# Patient Record
Sex: FEMALE | Race: BLACK OR AFRICAN AMERICAN
[De-identification: names, ages, dates, MRNs, and addresses within clinical notes are randomized per-mention and may not be internally consistent; named-entity substitution may affect disease eponyms.]

---

## 2018-05-24 ENCOUNTER — HOSPITAL ENCOUNTER (OUTPATIENT)
Dept: HOSPITAL 61 - ENDOS | Age: 58
Discharge: HOME | End: 2018-05-24
Attending: INTERNAL MEDICINE
Payer: MEDICARE

## 2018-05-24 DIAGNOSIS — Z86.73: ICD-10-CM

## 2018-05-24 DIAGNOSIS — E03.9: ICD-10-CM

## 2018-05-24 DIAGNOSIS — K64.0: ICD-10-CM

## 2018-05-24 DIAGNOSIS — E78.00: ICD-10-CM

## 2018-05-24 DIAGNOSIS — Z98.890: ICD-10-CM

## 2018-05-24 DIAGNOSIS — M17.12: ICD-10-CM

## 2018-05-24 DIAGNOSIS — Z91.040: ICD-10-CM

## 2018-05-24 DIAGNOSIS — Z12.11: Primary | ICD-10-CM

## 2018-05-24 DIAGNOSIS — I50.9: ICD-10-CM

## 2018-05-24 DIAGNOSIS — G47.30: ICD-10-CM

## 2018-05-24 DIAGNOSIS — D12.8: ICD-10-CM

## 2018-05-24 DIAGNOSIS — Z88.8: ICD-10-CM

## 2018-05-24 DIAGNOSIS — E66.9: ICD-10-CM

## 2018-05-24 DIAGNOSIS — I11.0: ICD-10-CM

## 2018-05-24 DIAGNOSIS — Z87.891: ICD-10-CM

## 2018-05-24 PROCEDURE — 88305 TISSUE EXAM BY PATHOLOGIST: CPT

## 2018-05-24 PROCEDURE — 45385 COLONOSCOPY W/LESION REMOVAL: CPT

## 2018-05-24 PROCEDURE — 45380 COLONOSCOPY AND BIOPSY: CPT

## 2018-05-24 RX ADMIN — BACITRACIN 1 MLS/HR: 5000 INJECTION, POWDER, FOR SOLUTION INTRAMUSCULAR at 14:06

## 2020-01-25 ENCOUNTER — HOSPITAL ENCOUNTER (INPATIENT)
Dept: HOSPITAL 61 - ER | Age: 60
LOS: 3 days | Discharge: HOME | DRG: 640 | End: 2020-01-28
Attending: INTERNAL MEDICINE | Admitting: INTERNAL MEDICINE
Payer: MEDICARE

## 2020-01-25 VITALS — SYSTOLIC BLOOD PRESSURE: 209 MMHG | DIASTOLIC BLOOD PRESSURE: 95 MMHG

## 2020-01-25 VITALS — SYSTOLIC BLOOD PRESSURE: 184 MMHG | DIASTOLIC BLOOD PRESSURE: 79 MMHG

## 2020-01-25 VITALS — SYSTOLIC BLOOD PRESSURE: 209 MMHG | DIASTOLIC BLOOD PRESSURE: 82 MMHG

## 2020-01-25 VITALS — BODY MASS INDEX: 48.75 KG/M2 | HEIGHT: 63 IN | WEIGHT: 275.12 LBS

## 2020-01-25 DIAGNOSIS — Z91.040: ICD-10-CM

## 2020-01-25 DIAGNOSIS — E21.3: ICD-10-CM

## 2020-01-25 DIAGNOSIS — Z88.8: ICD-10-CM

## 2020-01-25 DIAGNOSIS — E78.5: ICD-10-CM

## 2020-01-25 DIAGNOSIS — Z91.19: ICD-10-CM

## 2020-01-25 DIAGNOSIS — I12.0: ICD-10-CM

## 2020-01-25 DIAGNOSIS — Z99.2: ICD-10-CM

## 2020-01-25 DIAGNOSIS — E66.01: ICD-10-CM

## 2020-01-25 DIAGNOSIS — N18.6: ICD-10-CM

## 2020-01-25 DIAGNOSIS — Z91.15: ICD-10-CM

## 2020-01-25 DIAGNOSIS — E87.5: Primary | ICD-10-CM

## 2020-01-25 DIAGNOSIS — Z82.49: ICD-10-CM

## 2020-01-25 DIAGNOSIS — Z86.73: ICD-10-CM

## 2020-01-25 DIAGNOSIS — D63.1: ICD-10-CM

## 2020-01-25 LAB
ALBUMIN SERPL-MCNC: 3.1 G/DL (ref 3.4–5)
ALBUMIN/GLOB SERPL: 0.7 {RATIO} (ref 1–1.7)
ALP SERPL-CCNC: 73 U/L (ref 46–116)
ALT SERPL-CCNC: 15 U/L (ref 14–59)
ANION GAP SERPL CALC-SCNC: 14 MMOL/L (ref 6–14)
AST SERPL-CCNC: 13 U/L (ref 15–37)
BASOPHILS # BLD AUTO: 0 X10^3/UL (ref 0–0.2)
BASOPHILS NFR BLD: 1 % (ref 0–3)
BILIRUB SERPL-MCNC: 0.4 MG/DL (ref 0.2–1)
BUN SERPL-MCNC: 103 MG/DL (ref 7–20)
BUN/CREAT SERPL: 7 (ref 6–20)
CALCIUM SERPL-MCNC: 9.2 MG/DL (ref 8.5–10.1)
CHLORIDE SERPL-SCNC: 108 MMOL/L (ref 98–107)
CO2 SERPL-SCNC: 21 MMOL/L (ref 21–32)
CREAT SERPL-MCNC: 14.6 MG/DL (ref 0.6–1)
EOSINOPHIL NFR BLD: 0 X10^3/UL (ref 0–0.7)
EOSINOPHIL NFR BLD: 1 % (ref 0–3)
ERYTHROCYTE [DISTWIDTH] IN BLOOD BY AUTOMATED COUNT: 17.8 % (ref 11.5–14.5)
GFR SERPLBLD BASED ON 1.73 SQ M-ARVRAT: 3.1 ML/MIN
GLOBULIN SER-MCNC: 4.3 G/DL (ref 2.2–3.8)
GLUCOSE SERPL-MCNC: 92 MG/DL (ref 70–99)
HCT VFR BLD CALC: 27.5 % (ref 36–47)
HGB BLD-MCNC: 9.1 G/DL (ref 12–15.5)
LYMPHOCYTES # BLD: 0.5 X10^3/UL (ref 1–4.8)
LYMPHOCYTES NFR BLD AUTO: 11 % (ref 24–48)
MAGNESIUM SERPL-MCNC: 2 MG/DL (ref 1.8–2.4)
MCH RBC QN AUTO: 33 PG (ref 25–35)
MCHC RBC AUTO-ENTMCNC: 33 G/DL (ref 31–37)
MCV RBC AUTO: 99 FL (ref 79–100)
MONO #: 0.2 X10^3/UL (ref 0–1.1)
MONOCYTES NFR BLD: 4 % (ref 0–9)
NEUT #: 3.6 X10^3/UL (ref 1.8–7.7)
NEUTROPHILS NFR BLD AUTO: 83 % (ref 31–73)
PLATELET # BLD AUTO: 161 X10^3/UL (ref 140–400)
POTASSIUM SERPL-SCNC: 5.8 MMOL/L (ref 3.5–5.1)
PROT SERPL-MCNC: 7.4 G/DL (ref 6.4–8.2)
RBC # BLD AUTO: 2.78 X10^6/UL (ref 3.5–5.4)
SODIUM SERPL-SCNC: 143 MMOL/L (ref 136–145)
WBC # BLD AUTO: 4.3 X10^3/UL (ref 4–11)

## 2020-01-25 PROCEDURE — 80048 BASIC METABOLIC PNL TOTAL CA: CPT

## 2020-01-25 PROCEDURE — 84484 ASSAY OF TROPONIN QUANT: CPT

## 2020-01-25 PROCEDURE — 83735 ASSAY OF MAGNESIUM: CPT

## 2020-01-25 PROCEDURE — 80069 RENAL FUNCTION PANEL: CPT

## 2020-01-25 PROCEDURE — 93005 ELECTROCARDIOGRAM TRACING: CPT

## 2020-01-25 PROCEDURE — 81001 URINALYSIS AUTO W/SCOPE: CPT

## 2020-01-25 PROCEDURE — 87340 HEPATITIS B SURFACE AG IA: CPT

## 2020-01-25 PROCEDURE — G0378 HOSPITAL OBSERVATION PER HR: HCPCS

## 2020-01-25 PROCEDURE — 36415 COLL VENOUS BLD VENIPUNCTURE: CPT

## 2020-01-25 PROCEDURE — 85025 COMPLETE CBC W/AUTO DIFF WBC: CPT

## 2020-01-25 PROCEDURE — 96374 THER/PROPH/DIAG INJ IV PUSH: CPT

## 2020-01-25 PROCEDURE — 80053 COMPREHEN METABOLIC PANEL: CPT

## 2020-01-25 RX ADMIN — HYDRALAZINE HYDROCHLORIDE PRN MG: 20 INJECTION INTRAMUSCULAR; INTRAVENOUS at 22:49

## 2020-01-25 NOTE — PHYS DOC
Past Medical History


Past Medical History:  Hypertension, Renal Disease, Renal Failure, Stroke


Additional Past Medical Histor:  stroke 2013, 2019


Past Surgical History:  No Surgical History


Alcohol Use:  Occasionally


Drug Use:  None





Adult General


Chief Complaint


Chief Complaint:  DIALYSIS PROBLEM





HPI


HPI





Patient is a 59  year old [female] who presents with [fatigue, malaise. Patient 

reports she has not been to dialysis approximately 1.5 weeks, missing 5 

treatments at this time. States that she just did not have the energy to go. 

States she know she needs to her dialysis, however she just could not. Reports 

she has felt some fatigue, aching since that time. Does report she is still 

producing urine, also reports she no she has high blood pressure, does take 

hydralazine he stay for blood pressure. However has not taken any doses this 

evening. Denies dizziness. Denies nausea, vomiting, diarrhea. Denies abdominal 

pain. States she feels fine, not having any discomfort, but does continue to 

feel malaise


]





Review of Systems


Review of Systems





Constitutional: Denies fever or chills reports generalized malaise []


Eyes: Denies change in visual acuity, redness, or eye pain []


HENT: Denies nasal congestion or sore throat []


Respiratory: Denies cough or shortness of breath []


Cardiovascular: No additional information not addressed in HPI []


GI: Denies abdominal pain, nausea, vomiting, bloody stools or diarrhea []


: Denies dysuria or hematuria reports she still produces urine, although a 

small amount[]


Musculoskeletal: Denies back pain or joint pain []


Integument: Denies rash or skin lesions []


Neurologic: Denies headache, focal weakness or sensory changes []


Endocrine: Denies polyuria or polydipsia []





All other systems were reviewed and found to be within normal limits, except as 

documented in this note.





Current Medications


Current Medications





Current Medications








 Medications


  (Trade)  Dose


 Ordered  Sig/Varun  Start Time


 Stop Time Status Last Admin


Dose Admin


 


 Amlodipine


 Besylate


  (Norvasc)  10 mg  DAILY  1/25/20 19:15


   UNV  





 


 Hydralazine HCl


  (Apresoline Inj)  10 mg  1X  ONCE  1/25/20 18:00


 1/25/20 18:06 DC 1/25/20 18:17


10 MG


 


 Hydralazine HCl


  (Apresoline)  50 mg  TID  1/25/20 21:00


   UNV  





 


 Non-Formulary


 Medication


  (Carvedilol


  (Coreg))  1 tab  BID  1/25/20 21:00


   UNV  





 


 Non-Formulary


 Medication


  (Doxazosin


 Mesylate )  1 tab  DAILY  1/26/20 09:00


   UNV  





 


 Sodium


 Polystyrene


 Sulfonate


  (Kayexalate)  15 gm  1X  ONCE  1/25/20 19:00


 1/25/20 19:01 DC 1/25/20 19:05


15 GM











Allergies


Allergies





Allergies








Coded Allergies Type Severity Reaction Last Updated Verified


 


  latex Allergy Severe Rash 5/24/18 Yes


 


  iodine Adverse Reaction Intermediate  5/24/18 Yes











Physical Exam


Physical Exam





Constitutional: Well developed, well nourished, no acute distress, non-toxic 

appearance. Grossly obese  []


HENT: Normocephalic, atraumatic, bilateral external ears normal, oropharynx josé

st, no oral exudates, nose normal. []


Eyes: PERRLA, EOMI, conjunctiva normal, no discharge. [] 


Neck: Normal range of motion, no tenderness, supple, no stridor. [] 


Cardiovascular:Heart rate regular rhythm, no murmur []


Lungs & Thorax:  Bilateral breath sounds clear to auscultation []


Abdomen: Bowel sounds normal, soft, no tenderness, no masses, no pulsatile 

masses. [] 


Skin: Warm, dry, no erythema, no rash. [] 


Back: No tenderness, no CVA tenderness. [] 


Extremities: No tenderness, no cyanosis, no clubbing, ROM intact, no edema. [] 


Neurologic: Alert and oriented X 3, normal motor function, normal sensory 

function, no focal deficits noted. []


Psychologic: Affect normal, judgement normal, mood normal. []





Current Patient Data


Vital Signs





                                   Vital Signs








  Date Time  Temp Pulse Resp B/P (MAP) Pulse Ox O2 Delivery O2 Flow Rate FiO2


 


1/25/20 18:54  73 18 185/79 (114) 95 Room Air  


 


1/25/20 17:39 97.6       





 97.6       








Lab Values





                                Laboratory Tests








Test


 1/25/20


17:50


 


White Blood Count


 4.3 x10^3/uL


(4.0-11.0)


 


Red Blood Count


 2.78 x10^6/uL


(3.50-5.40)  L


 


Hemoglobin


 9.1 g/dL


(12.0-15.5)  L


 


Hematocrit


 27.5 %


(36.0-47.0)  L


 


Mean Corpuscular Volume


 99 fL ()





 


Mean Corpuscular Hemoglobin 33 pg (25-35)  


 


Mean Corpuscular Hemoglobin


Concent 33 g/dL


(31-37)


 


Red Cell Distribution Width


 17.8 %


(11.5-14.5)  H


 


Platelet Count


 161 x10^3/uL


(140-400)


 


Neutrophils (%) (Auto) 83 % (31-73)  H


 


Lymphocytes (%) (Auto) 11 % (24-48)  L


 


Monocytes (%) (Auto) 4 % (0-9)  


 


Eosinophils (%) (Auto) 1 % (0-3)  


 


Basophils (%) (Auto) 1 % (0-3)  


 


Neutrophils # (Auto)


 3.6 x10^3/uL


(1.8-7.7)


 


Lymphocytes # (Auto)


 0.5 x10^3/uL


(1.0-4.8)  L


 


Monocytes # (Auto)


 0.2 x10^3/uL


(0.0-1.1)


 


Eosinophils # (Auto)


 0.0 x10^3/uL


(0.0-0.7)


 


Basophils # (Auto)


 0.0 x10^3/uL


(0.0-0.2)


 


Sodium Level


 143 mmol/L


(136-145)


 


Potassium Level


 5.8 mmol/L


(3.5-5.1)  H


 


Chloride Level


 108 mmol/L


()  H


 


Carbon Dioxide Level


 21 mmol/L


(21-32)


 


Anion Gap 14 (6-14)  


 


Blood Urea Nitrogen


 103 mg/dL


(7-20)  H


 


Creatinine


 14.6 mg/dL


(0.6-1.0)  H


 


Estimated GFR


(Cockcroft-Gault) 3.1  





 


BUN/Creatinine Ratio 7 (6-20)  


 


Glucose Level


 92 mg/dL


(70-99)


 


Calcium Level


 9.2 mg/dL


(8.5-10.1)


 


Magnesium Level


 2.0 mg/dL


(1.8-2.4)


 


Total Bilirubin


 0.4 mg/dL


(0.2-1.0)


 


Aspartate Amino Transferase


(AST) 13 U/L (15-37)


L


 


Alanine Aminotransferase (ALT)


 15 U/L (14-59)





 


Alkaline Phosphatase


 73 U/L


()


 


Troponin I Quantitative


 < 0.017 ng/mL


(0.000-0.055)


 


Total Protein


 7.4 g/dL


(6.4-8.2)


 


Albumin


 3.1 g/dL


(3.4-5.0)  L


 


Albumin/Globulin Ratio


 0.7 (1.0-1.7)


L





                                Laboratory Tests


1/25/20 17:50








                                Laboratory Tests


1/25/20 17:50











EKG


EKG


!1828 Per Dr Thayer. No ST changes. Sinus rhythm. occasional PVC[]





Radiology/Procedures


Radiology/Procedures


[]





Course & Med Decision Making


Course & Med Decision Making


Pertinent Labs and Imaging studies reviewed. (See chart for details)





[]@1845 Discussed with Dr Lua, Nephrology, recommends kayexalate and patient to

 be dialyzed tomorrow. Recommends admission. 





@1840 - Repeat blood pressure following hydralazine 185/79  Patient continues 

resting in room at this time. 





@1850 Discussed with Dr Zamora.agrees to admission.  Will continue Hydralazine 

for blood pressure.





Dragon Disclaimer


Dragon Disclaimer


This electronic medical record was generated, in whole or in part, using a voice

 recognition dictation system.





Departure


Departure


Impression:  


   Primary Impression:  


   Hyperkalemia


   Additional Impression:  


   ESRD (end stage renal disease)


Disposition:  09 ADMITTED AS INPATIENT


Admitting Physician:  HIMS


Condition:  STABLE


Referrals:  


SAVANNAH REZA (PCP)





Problem Qualifiers











LORRI GALLEGOS              Jan 25, 2020 18:04

## 2020-01-26 VITALS — DIASTOLIC BLOOD PRESSURE: 79 MMHG | SYSTOLIC BLOOD PRESSURE: 178 MMHG

## 2020-01-26 VITALS — DIASTOLIC BLOOD PRESSURE: 87 MMHG | SYSTOLIC BLOOD PRESSURE: 196 MMHG

## 2020-01-26 VITALS — DIASTOLIC BLOOD PRESSURE: 72 MMHG | SYSTOLIC BLOOD PRESSURE: 155 MMHG

## 2020-01-26 VITALS — SYSTOLIC BLOOD PRESSURE: 174 MMHG | DIASTOLIC BLOOD PRESSURE: 95 MMHG

## 2020-01-26 VITALS — SYSTOLIC BLOOD PRESSURE: 173 MMHG | DIASTOLIC BLOOD PRESSURE: 79 MMHG

## 2020-01-26 LAB
AMORPH SED URNS QL MICRO: PRESENT /HPF
ANION GAP SERPL CALC-SCNC: 15 MMOL/L (ref 6–14)
APTT PPP: YELLOW S
BACTERIA #/AREA URNS HPF: 0 /HPF
BASOPHILS # BLD AUTO: 0 X10^3/UL (ref 0–0.2)
BASOPHILS NFR BLD: 1 % (ref 0–3)
BILIRUB UR QL STRIP: NEGATIVE
BUN SERPL-MCNC: 102 MG/DL (ref 7–20)
CALCIUM SERPL-MCNC: 9.3 MG/DL (ref 8.5–10.1)
CHLORIDE SERPL-SCNC: 107 MMOL/L (ref 98–107)
CO2 SERPL-SCNC: 19 MMOL/L (ref 21–32)
CREAT SERPL-MCNC: 14.6 MG/DL (ref 0.6–1)
EOSINOPHIL NFR BLD: 0.1 X10^3/UL (ref 0–0.7)
EOSINOPHIL NFR BLD: 1 % (ref 0–3)
ERYTHROCYTE [DISTWIDTH] IN BLOOD BY AUTOMATED COUNT: 17.6 % (ref 11.5–14.5)
FIBRINOGEN PPP-MCNC: CLEAR MG/DL
GFR SERPLBLD BASED ON 1.73 SQ M-ARVRAT: 3.1 ML/MIN
GLUCOSE SERPL-MCNC: 86 MG/DL (ref 70–99)
HCT VFR BLD CALC: 26.3 % (ref 36–47)
HGB BLD-MCNC: 8.7 G/DL (ref 12–15.5)
LYMPHOCYTES # BLD: 0.6 X10^3/UL (ref 1–4.8)
LYMPHOCYTES NFR BLD AUTO: 15 % (ref 24–48)
MCH RBC QN AUTO: 33 PG (ref 25–35)
MCHC RBC AUTO-ENTMCNC: 33 G/DL (ref 31–37)
MCV RBC AUTO: 100 FL (ref 79–100)
MONO #: 0.2 X10^3/UL (ref 0–1.1)
MONOCYTES NFR BLD: 4 % (ref 0–9)
NEUT #: 3.5 X10^3/UL (ref 1.8–7.7)
NEUTROPHILS NFR BLD AUTO: 79 % (ref 31–73)
NITRITE UR QL STRIP: NEGATIVE
PH UR STRIP: 7 [PH]
PLATELET # BLD AUTO: 156 X10^3/UL (ref 140–400)
POTASSIUM SERPL-SCNC: 5.3 MMOL/L (ref 3.5–5.1)
PROT UR STRIP-MCNC: 100 MG/DL
RBC # BLD AUTO: 2.64 X10^6/UL (ref 3.5–5.4)
RBC #/AREA URNS HPF: 0 /HPF (ref 0–2)
SODIUM SERPL-SCNC: 141 MMOL/L (ref 136–145)
SQUAMOUS #/AREA URNS LPF: (no result) /LPF
UROBILINOGEN UR-MCNC: 0.2 MG/DL
WBC # BLD AUTO: 4.4 X10^3/UL (ref 4–11)
WBC #/AREA URNS HPF: 0 /HPF (ref 0–4)

## 2020-01-26 PROCEDURE — 5A1D70Z PERFORMANCE OF URINARY FILTRATION, INTERMITTENT, LESS THAN 6 HOURS PER DAY: ICD-10-PCS | Performed by: INTERNAL MEDICINE

## 2020-01-26 RX ADMIN — DOXAZOSIN SCH MG: 1 TABLET ORAL at 20:38

## 2020-01-26 RX ADMIN — HEPARIN SODIUM SCH UNIT: 5000 INJECTION, SOLUTION INTRAVENOUS; SUBCUTANEOUS at 14:13

## 2020-01-26 RX ADMIN — HYDRALAZINE HYDROCHLORIDE PRN MG: 20 INJECTION INTRAMUSCULAR; INTRAVENOUS at 08:07

## 2020-01-26 RX ADMIN — HEPARIN SODIUM SCH UNIT: 5000 INJECTION, SOLUTION INTRAVENOUS; SUBCUTANEOUS at 20:43

## 2020-01-26 RX ADMIN — CARVEDILOL SCH MG: 12.5 TABLET, FILM COATED ORAL at 14:03

## 2020-01-26 RX ADMIN — CARVEDILOL SCH MG: 12.5 TABLET, FILM COATED ORAL at 17:43

## 2020-01-26 NOTE — EKG
[facility]

              [address] [place] [address]

Test Date:    2020               Test Time:    18:21:53

Pat Name:     NAOMIE HENSLEY            Department:   

Patient ID:   [id number]           Room:          

Gender:       F                        Technician:   

:          [id number]               Requested By: LORRI GALLEGOS

Order Number: [id number]           Reading MD:     

                                 Measurements

Intervals                              Axis          

Rate:         69                       P:            47

WA:           174                      QRS:          -36

QRSD:         120                      T:            0

QT:           472                                    

QTc:          513                                    

                           Interpretive Statements

SINUS RHYTHM

VENTRICULAR PREMATURE COMPLEX(ES)

ABNORMAL LEFT AXIS DEVIATION

R-S TRANSITION ZONE IN V LEADS DISPLACED TO THE LEFT

LEFT ANTERIOR FASCICULAR BLOCK

NON SPECIFIC T ABNORMALITY

ABNORMAL ECG

No previous ECG available for comparison

## 2020-01-26 NOTE — PDOC2
CONSULT


Date of Consult


Date of Consult


DATE: 1/26/20 


TIME: 11:15





Reason for Consult


Reason for Consult:


ESRD. missed HD, Hyperkalemia





Identification/Chief Complaint


Chief Complaint


None currently





Source


Source:  Chart review, Patient





History of Present Illness


Reason for Visit:








Patient is a 59  year old [employer] ESRD on HD MWF  who presented to the ER  with 

[fatigue, malaise. She reports she has not been to dialysis approximately 1.5 

weeks, missing 5 treatments at this time due to the weather  


In the ER she reported she missed as she just did not have the energy to go . 

Does report she is still producing urine,Denies dizziness. Denies nausea, 

vomiting, diarrhea. Denies abdominal pain. States she feels fine, not having any

 discomfort, but does continue to feel malaise


Non compliance with BP meds  


]





Past Medical History


Cardiovascular:  HTN, Hyperlipidemia


Pulmonary:  Other


GI:  No pertinent hx


Heme/Onc:  Anemia NOS


Hepatobiliary:  No pertinent hx


Psych:  No pertinent hx


Infectious disease:  No pertinent hx


Endocrine:  Hyperparathyroidism





Past Surgical History


Past Surgical History:  No pertinent history





Social History


ALCOHOL:  none


Drugs:  None





Current Problem List


Problem List


Problems


Medical Problems:


(1) ESRD (end stage renal disease)


Status: Acute  





(2) Hyperkalemia


Status: Acute  











Current Medications


Current Medications





Current Medications


Hydralazine HCl (Apresoline Inj) 10 mg 1X  ONCE IVP  Last administered on 

1/25/20at 18:17;  Start 1/25/20 at 18:00;  Stop 1/25/20 at 18:06;  Status DC


Sodium Polystyrene Sulfonate (Kayexalate) 15 gm 1X  ONCE PO  Last administered 

on 1/25/20at 19:05;  Start 1/25/20 at 19:00;  Stop 1/25/20 at 19:01;  Status DC


Amlodipine Besylate (Norvasc) 10 mg DAILY PO  Last administered on 1/25/20at 

19:26;  Start 1/25/20 at 19:15


Hydralazine HCl (Apresoline) 50 mg TID PO ;  Start 1/25/20 at 19:10;  Stop 

1/25/20 at 19:12;  Status DC


Carvedilol (Coreg) 25 mg BIDWMEALS PO ;  Start 1/25/20 at 19:11;  Stop 1/25/20 

at 19:12;  Status DC


Doxazosin Mesylate (Cardura) 2 mg DAILY PO ;  Start 1/26/20 at 09:00


Hydralazine HCl (Apresoline) 50 mg TID PO  Last administered on 1/25/20at 21:07;

 Start 1/25/20 at 21:00


Carvedilol (Coreg) 25 mg BIDWMEALS PO ;  Start 1/26/20 at 08:00


Hydralazine HCl (Apresoline Inj) 10 mg PRN Q4HRS  ONCE IVP ;  Start 1/25/20 at 

19:15;  Stop 1/25/20 at 19:23;  Status DC


Hydralazine HCl (Apresoline Inj) 10 mg PRN Q4HRS  PRN IVP HYPERTENSION Last adm

inistered on 1/26/20at 08:07;  Start 1/25/20 at 19:30


Sodium Chloride 1,000 ml @  1,000 mls/hr Q1H PRN IV hypotension;  Start 1/26/20 

at 09:09;  Stop 1/26/20 at 15:08


Albumin Human 200 ml @  200 mls/hr 1X PRN  PRN IV Hypotension;  Start 1/26/20 at

09:15;  Stop 1/26/20 at 15:14


Info (PHARMACY MONITORING -- do not chart) 1 each PRN DAILY  PRN MC SEE 

COMMENTS;  Start 1/26/20 at 09:15


Info (PHARMACY MONITORING -- do not chart) 1 each PRN DAILY  PRN MC SEE 

COMMENTS;  Start 1/26/20 at 09:15;  Status UNV





Active Scripts


Active


Reported


Renvela (Sevelamer Carbonate) 800 Mg Tablet 800 Mg PO TIDWMEALS


Micardis (Telmisartan) 80 Mg Tablet 1 Tab PO DAILY


Atorvastatin Calcium 40 Mg Tablet 40 Mg PO HS


Coreg (Carvedilol) 25 Mg Tablet 1 Tab PO BID


Hydralazine Hcl 50 Mg Tablet 1 Tab PO TID


Amlodipine Besylate 10 Mg Tablet 1 Tab PO DAILY


Doxazosin Mesylate 2 Mg Tablet 1 Tab PO QHS


Sensipar (Cinacalcet Hcl) 30 Mg Tablet 1 Tab PO QMWF





Allergies


Allergies:  


Coded Allergies:  


     latex (Verified  Allergy, Severe, Rash, 5/24/18)


     iodine (Verified  Adverse Reaction, Intermediate, 5/24/18)





ROS


Review of System


   As per HPI





Physical Exam


Physical Exam


GEN:   NAD 


HEEN:    OM moist  


NECK:supple  


CVS:  RRR 


RESP: CTA, Non labored 


GI:      NT, Obese  


:     No ] CVA tenderness, No andres


NEURO- Grossly normal


DERM- NO rash


Vital Signs





Vital Signs








  Date Time  Temp Pulse Resp B/P (MAP) Pulse Ox O2 Delivery O2 Flow Rate FiO2


 


1/26/20 08:07  75  196/87    


 


1/26/20 08:00      Room Air  


 


1/26/20 07:34 97.8    94   





 97.8       


 


1/26/20 03:30   18     








Assessment & Plan


ESRD - On HD MWF, miised 5 treatments  


Presented with very high BUN/Cr and K


Dialyzed Today, seen on HD, tolerating well, continue as ordered , [facility] 


HD again tomoorow per her regular schedule 





Hyperkalemia  -Dialysis 





HTN- Missed RRT and Non compliance with antihypertensives  


Start Home antihypertensives  





Anemia- STEVEN per protocol for goal Hgb 10-11





Labs


Labs





Laboratory Tests








Test


 1/25/20


17:50 1/26/20


03:30 1/26/20


04:30


 


White Blood Count


 4.3 x10^3/uL


(4.0-11.0) 


 4.4 x10^3/uL


(4.0-11.0)


 


Red Blood Count


 2.78 x10^6/uL


(3.50-5.40) 


 2.64 x10^6/uL


(3.50-5.40)


 


Hemoglobin


 9.1 g/dL


(12.0-15.5) 


 8.7 g/dL


(12.0-15.5)


 


Hematocrit


 27.5 %


(36.0-47.0) 


 26.3 %


(36.0-47.0)


 


Mean Corpuscular Volume


 99 fL () 


 


 100 fL


()


 


Mean Corpuscular Hemoglobin 33 pg (25-35)   33 pg (25-35) 


 


Mean Corpuscular Hemoglobin


Concent 33 g/dL


(31-37) 


 33 g/dL


(31-37)


 


Red Cell Distribution Width


 17.8 %


(11.5-14.5) 


 17.6 %


(11.5-14.5)


 


Platelet Count


 161 x10^3/uL


(140-400) 


 156 x10^3/uL


(140-400)


 


Neutrophils (%) (Auto) 83 % (31-73)   79 % (31-73) 


 


Lymphocytes (%) (Auto) 11 % (24-48)   15 % (24-48) 


 


Monocytes (%) (Auto) 4 % (0-9)   4 % (0-9) 


 


Eosinophils (%) (Auto) 1 % (0-3)   1 % (0-3) 


 


Basophils (%) (Auto) 1 % (0-3)   1 % (0-3) 


 


Neutrophils # (Auto)


 3.6 x10^3/uL


(1.8-7.7) 


 3.5 x10^3/uL


(1.8-7.7)


 


Lymphocytes # (Auto)


 0.5 x10^3/uL


(1.0-4.8) 


 0.6 x10^3/uL


(1.0-4.8)


 


Monocytes # (Auto)


 0.2 x10^3/uL


(0.0-1.1) 


 0.2 x10^3/uL


(0.0-1.1)


 


Eosinophils # (Auto)


 0.0 x10^3/uL


(0.0-0.7) 


 0.1 x10^3/uL


(0.0-0.7)


 


Basophils # (Auto)


 0.0 x10^3/uL


(0.0-0.2) 


 0.0 x10^3/uL


(0.0-0.2)


 


Sodium Level


 143 mmol/L


(136-145) 


 141 mmol/L


(136-145)


 


Potassium Level


 5.8 mmol/L


(3.5-5.1) 


 5.3 mmol/L


(3.5-5.1)


 


Chloride Level


 108 mmol/L


() 


 107 mmol/L


()


 


Carbon Dioxide Level


 21 mmol/L


(21-32) 


 19 mmol/L


(21-32)


 


Anion Gap 14 (6-14)   15 (6-14) 


 


Blood Urea Nitrogen


 103 mg/dL


(7-20) 


 102 mg/dL


(7-20)


 


Creatinine


 14.6 mg/dL


(0.6-1.0) 


 14.6 mg/dL


(0.6-1.0)


 


Estimated GFR


(Cockcroft-Gault) 3.1 


 


 3.1 





 


BUN/Creatinine Ratio 7 (6-20)   


 


Glucose Level


 92 mg/dL


(70-99) 


 86 mg/dL


(70-99)


 


Calcium Level


 9.2 mg/dL


(8.5-10.1) 


 9.3 mg/dL


(8.5-10.1)


 


Magnesium Level


 2.0 mg/dL


(1.8-2.4) 


 





 


Total Bilirubin


 0.4 mg/dL


(0.2-1.0) 


 





 


Aspartate Amino Transf


(AST/SGOT) 13 U/L (15-37) 


 


 





 


Alanine Aminotransferase


(ALT/SGPT) 15 U/L (14-59) 


 


 





 


Alkaline Phosphatase


 73 U/L


() 


 





 


Troponin I Quantitative


 < 0.017 ng/mL


(0.000-0.055) 


 





 


Total Protein


 7.4 g/dL


(6.4-8.2) 


 





 


Albumin


 3.1 g/dL


(3.4-5.0) 


 





 


Albumin/Globulin Ratio 0.7 (1.0-1.7)   


 


Urine Collection Type  Unknown  


 


Urine Color  Yellow  


 


Urine Clarity  Clear  


 


Urine pH  7.0  


 


Urine Specific Gravity  1.015  


 


Urine Protein


 


 100 mg/dL


(NEG-TRACE) 





 


Urine Glucose (UA)


 


 Negative mg/dL


(NEG) 





 


Urine Ketones (Stick)


 


 Negative mg/dL


(NEG) 





 


Urine Blood  Negative (NEG)  


 


Urine Nitrite  Negative (NEG)  


 


Urine Bilirubin  Negative (NEG)  


 


Urine Urobilinogen Dipstick


 


 0.2 mg/dL (0.2


mg/dL) 





 


Urine Leukocyte Esterase  Negative (NEG)  


 


Urine RBC  0 /HPF (0-2)  


 


Urine WBC  0 /HPF (0-4)  


 


Urine Squamous Epithelial


Cells 


 Many /LPF 


 





 


Urine Amorphous Sediment  Present /HPF  


 


Urine Bacteria  0 /HPF (0-FEW)  


 


Urine Mucus  Slight /LPF  


 


Hepatitis B Surface Antigen


 


 


 Nonreactive


(Nonreactive)








Laboratory Tests








Test


 1/25/20


17:50 1/26/20


03:30 1/26/20


04:30


 


White Blood Count


 4.3 x10^3/uL


(4.0-11.0) 


 4.4 x10^3/uL


(4.0-11.0)


 


Red Blood Count


 2.78 x10^6/uL


(3.50-5.40) 


 2.64 x10^6/uL


(3.50-5.40)


 


Hemoglobin


 9.1 g/dL


(12.0-15.5) 


 8.7 g/dL


(12.0-15.5)


 


Hematocrit


 27.5 %


(36.0-47.0) 


 26.3 %


(36.0-47.0)


 


Mean Corpuscular Volume


 99 fL () 


 


 100 fL


()


 


Mean Corpuscular Hemoglobin 33 pg (25-35)   33 pg (25-35) 


 


Mean Corpuscular Hemoglobin


Concent 33 g/dL


(31-37) 


 33 g/dL


(31-37)


 


Red Cell Distribution Width


 17.8 %


(11.5-14.5) 


 17.6 %


(11.5-14.5)


 


Platelet Count


 161 x10^3/uL


(140-400) 


 156 x10^3/uL


(140-400)


 


Neutrophils (%) (Auto) 83 % (31-73)   79 % (31-73) 


 


Lymphocytes (%) (Auto) 11 % (24-48)   15 % (24-48) 


 


Monocytes (%) (Auto) 4 % (0-9)   4 % (0-9) 


 


Eosinophils (%) (Auto) 1 % (0-3)   1 % (0-3) 


 


Basophils (%) (Auto) 1 % (0-3)   1 % (0-3) 


 


Neutrophils # (Auto)


 3.6 x10^3/uL


(1.8-7.7) 


 3.5 x10^3/uL


(1.8-7.7)


 


Lymphocytes # (Auto)


 0.5 x10^3/uL


(1.0-4.8) 


 0.6 x10^3/uL


(1.0-4.8)


 


Monocytes # (Auto)


 0.2 x10^3/uL


(0.0-1.1) 


 0.2 x10^3/uL


(0.0-1.1)


 


Eosinophils # (Auto)


 0.0 x10^3/uL


(0.0-0.7) 


 0.1 x10^3/uL


(0.0-0.7)


 


Basophils # (Auto)


 0.0 x10^3/uL


(0.0-0.2) 


 0.0 x10^3/uL


(0.0-0.2)


 


Sodium Level


 143 mmol/L


(136-145) 


 141 mmol/L


(136-145)


 


Potassium Level


 5.8 mmol/L


(3.5-5.1) 


 5.3 mmol/L


(3.5-5.1)


 


Chloride Level


 108 mmol/L


() 


 107 mmol/L


()


 


Carbon Dioxide Level


 21 mmol/L


(21-32) 


 19 mmol/L


(21-32)


 


Anion Gap 14 (6-14)   15 (6-14) 


 


Blood Urea Nitrogen


 103 mg/dL


(7-20) 


 102 mg/dL


(7-20)


 


Creatinine


 14.6 mg/dL


(0.6-1.0) 


 14.6 mg/dL


(0.6-1.0)


 


Estimated GFR


(Cockcroft-Gault) 3.1 


 


 3.1 





 


BUN/Creatinine Ratio 7 (6-20)   


 


Glucose Level


 92 mg/dL


(70-99) 


 86 mg/dL


(70-99)


 


Calcium Level


 9.2 mg/dL


(8.5-10.1) 


 9.3 mg/dL


(8.5-10.1)


 


Magnesium Level


 2.0 mg/dL


(1.8-2.4) 


 





 


Total Bilirubin


 0.4 mg/dL


(0.2-1.0) 


 





 


Aspartate Amino Transf


(AST/SGOT) 13 U/L (15-37) 


 


 





 


Alanine Aminotransferase


(ALT/SGPT) 15 U/L (14-59) 


 


 





 


Alkaline Phosphatase


 73 U/L


() 


 





 


Troponin I Quantitative


 < 0.017 ng/mL


(0.000-0.055) 


 





 


Total Protein


 7.4 g/dL


(6.4-8.2) 


 





 


Albumin


 3.1 g/dL


(3.4-5.0) 


 





 


Albumin/Globulin Ratio 0.7 (1.0-1.7)   


 


Urine Collection Type  Unknown  


 


Urine Color  Yellow  


 


Urine Clarity  Clear  


 


Urine pH  7.0  


 


Urine Specific Gravity  1.015  


 


Urine Protein


 


 100 mg/dL


(NEG-TRACE) 





 


Urine Glucose (UA)


 


 Negative mg/dL


(NEG) 





 


Urine Ketones (Stick)


 


 Negative mg/dL


(NEG) 





 


Urine Blood  Negative (NEG)  


 


Urine Nitrite  Negative (NEG)  


 


Urine Bilirubin  Negative (NEG)  


 


Urine Urobilinogen Dipstick


 


 0.2 mg/dL (0.2


mg/dL) 





 


Urine Leukocyte Esterase  Negative (NEG)  


 


Urine RBC  0 /HPF (0-2)  


 


Urine WBC  0 /HPF (0-4)  


 


Urine Squamous Epithelial


Cells 


 Many /LPF 


 





 


Urine Amorphous Sediment  Present /HPF  


 


Urine Bacteria  0 /HPF (0-FEW)  


 


Urine Mucus  Slight /LPF  


 


Hepatitis B Surface Antigen


 


 


 Nonreactive


(Nonreactive)








Review


All relevant outside records, renal labs, imaging studies, telemetry/EKG's were 

reviewed.











PATTI MONTOYA MD                Jan 26, 2020 11:17

## 2020-01-26 NOTE — PDOC1
History and Physical


Date of Admission


Date of Admission


DATE: 1/26/20 


TIME: 09:56





Identification/Chief Complaint


Chief Complaint


 SEEN IN ER WITH 59  year old [female]  [fatigue, malaise. Patient reports she 

has not been to dialysis approximately 1.5 weeks, missing 5 treatments at this 

time. 





States that she just did not have the energy to go. States she know she needs to

 her dialysis, however she just could not. // she has felt some fatigue, aching 

since that time. Does report she is still producing urine, also reports she  she

 has high blood pressure, does take hydralazine  for blood pressure.,,  However 

has not taken any yesterday





Past Medical History


Past Medical History


                                                            


 Past Medical History 


Past Medical History


Past Medical History:  Hypertension, Renal Disease, Renal Failure, Stroke


Additional Past Medical Histor:  stroke 2013, 2019


Past Surgical History:  No Surgical History


Alcohol Use:  Occasionally


Drug Use:  None





PAST MEDICAL HISTORY:  Chronic kidney disease, hypertension, obesity, anemia, 


hyperparathyroidism, untreated hepatitis C and treated hepatitis B.











SOCIAL HISTORY:  Nonsmoker.  No alcohol or illicit drug use.











family hx obesity


Cardiovascular:  HTN, Hyperlipidemia


Pulmonary:  Other


GI:  No pertinent hx


Heme/Onc:  Anemia NOS


Hepatobiliary:  No pertinent hx


Psych:  No pertinent hx


Infectious disease:  No pertinent hx


Endocrine:  Hyperparathyroidism





Past Surgical History


Past Surgical History:  No pertinent history





Family History


Family History:  Hypertension





Social History


Smoke:  No


ALCOHOL:  none


Drugs:  None





Current Problem List


Problem List


Problems


Medical Problems:


(1) ESRD (end stage renal disease)


Status: Acute  





(2) Hyperkalemia


Status: Acute  











Current Medications


Current Medications





Current Medications


Hydralazine HCl (Apresoline Inj) 10 mg 1X  ONCE IVP  Last administered on 

1/25/20at 18:17;  Start 1/25/20 at 18:00;  Stop 1/25/20 at 18:06;  Status DC


Sodium Polystyrene Sulfonate (Kayexalate) 15 gm 1X  ONCE PO  Last administered 

on 1/25/20at 19:05;  Start 1/25/20 at 19:00;  Stop 1/25/20 at 19:01;  Status DC


Amlodipine Besylate (Norvasc) 10 mg DAILY PO  Last administered on 1/25/20at 

19:26;  Start 1/25/20 at 19:15


Hydralazine HCl (Apresoline) 50 mg TID PO ;  Start 1/25/20 at 19:10;  Stop 

1/25/20 at 19:12;  Status DC


Carvedilol (Coreg) 25 mg BIDWMEALS PO ;  Start 1/25/20 at 19:11;  Stop 1/25/20 

at 19:12;  Status DC


Doxazosin Mesylate (Cardura) 2 mg DAILY PO ;  Start 1/26/20 at 09:00


Hydralazine HCl (Apresoline) 50 mg TID PO  Last administered on 1/25/20at 21:07;

  Start 1/25/20 at 21:00


Carvedilol (Coreg) 25 mg BIDWMEALS PO ;  Start 1/26/20 at 08:00


Hydralazine HCl (Apresoline Inj) 10 mg PRN Q4HRS  ONCE IVP ;  Start 1/25/20 at 

19:15;  Stop 1/25/20 at 19:23;  Status DC


Hydralazine HCl (Apresoline Inj) 10 mg PRN Q4HRS  PRN IVP HYPERTENSION Last 

administered on 1/26/20at 08:07;  Start 1/25/20 at 19:30


Sodium Chloride 1,000 ml @  1,000 mls/hr Q1H PRN IV hypotension;  Start 1/26/20 

at 09:09;  Stop 1/26/20 at 15:08


Albumin Human 200 ml @  200 mls/hr 1X PRN  PRN IV Hypotension;  Start 1/26/20 at

 09:15;  Stop 1/26/20 at 15:14


Info (PHARMACY MONITORING -- do not chart) 1 each PRN DAILY  PRN MC SEE 

COMMENTS;  Start 1/26/20 at 09:15


Info (PHARMACY MONITORING -- do not chart) 1 each PRN DAILY  PRN MC SEE 

COMMENTS;  Start 1/26/20 at 09:15;  Status UNV





Active Scripts


Active


Reported


Renvela (Sevelamer Carbonate) 800 Mg Tablet 800 Mg PO TIDWMEALS


Micardis (Telmisartan) 80 Mg Tablet 1 Tab PO DAILY


Atorvastatin Calcium 40 Mg Tablet 40 Mg PO HS


Coreg (Carvedilol) 25 Mg Tablet 1 Tab PO BID


Hydralazine Hcl 50 Mg Tablet 1 Tab PO TID


Amlodipine Besylate 10 Mg Tablet 1 Tab PO DAILY


Doxazosin Mesylate 2 Mg Tablet 1 Tab PO QHS


Sensipar (Cinacalcet Hcl) 30 Mg Tablet 1 Tab PO QMWF





Allergies


Allergies:  


Coded Allergies:  


     latex (Verified  Allergy, Severe, Rash, 5/24/18)


     iodine (Verified  Adverse Reaction, Intermediate, 5/24/18)





ROS


Review of System





Review of Systems


Review of Systems





Constitutional: Denies fever or chills reports generalized malaise []


Eyes: Denies change in visual acuity, redness, or eye pain []


HENT: Denies nasal congestion or sore throat []


Respiratory: Denies cough or shortness of breath []


Cardiovascular: No additional information not addressed in HPI []


GI: Denies abdominal pain, nausea, vomiting, bloody stools or diarrhea []


: Denies dysuria or hematuria reports she still produces urine, although a 

small amount[]


Musculoskeletal: Denies back pain or joint pain []


Integument: Denies rash or skin lesions []


Neurologic: Denies headache, focal weakness or sensory changes []


Endocrine: Denies polyuria or polydipsia []





14 PT  systems were reviewed and found to be within normal limits, except as 

documented





Physical Exam


Physical Exam





Physical Exam


Physical Exam





Constitutional: Well developed, well nourished, no acute distress, non-toxic 

appearance. Grossly obese  []


HENT: Normocephalic, atraumatic, bilateral external ears normal, oropharynx 

moist, no oral exudates, nose normal. []


Eyes: PERRLA, EOMI, conjunctiva normal, no discharge. [] 


Neck: Normal range of motion, no tenderness, supple, no stridor. [] 


Cardiovascular:Heart rate regular rhythm, no murmur []


Lungs & Thorax:  Bilateral breath sounds clear to auscultation []


Abdomen: Bowel sounds normal, soft, no tenderness, no masses, no pulsatile 

masses. [] 


Skin: Warm, dry, no erythema, no rash. [] 


Back: No tenderness, no CVA tenderness. [] 


Extremities: No tenderness, no cyanosis, no clubbing, ROM intact, no edema. [] 


Neurologic: Alert and oriented X 3, normal motor function, normal sensory 

function, no focal deficits noted. []


Psychologic: Affect normal, judgement normal, mood normal. []


General:  Alert, Oriented X3, Cooperative, No acute distress


Breasts:  Not examined


Abdomen:  Soft


Rectal Exam:  not examined


Extremities:  No cyanosis


Neuro:  Normal speech, Cranial nerves 3-12 NL


Psych/Mental Status:  Mental status NL, Mood NL





Vitals


Vitals





Vital Signs








  Date Time  Temp Pulse Resp B/P (MAP) Pulse Ox O2 Delivery O2 Flow Rate FiO2


 


1/26/20 08:07  75  196/87    


 


1/26/20 08:00      Room Air  


 


1/26/20 07:34 97.8    94   





 97.8       


 


1/26/20 03:30   18     











Labs


Labs





Laboratory Tests








Test


 1/25/20


17:50 1/26/20


03:30 1/26/20


04:30


 


White Blood Count


 4.3 x10^3/uL


(4.0-11.0) 


 4.4 x10^3/uL


(4.0-11.0)


 


Red Blood Count


 2.78 x10^6/uL


(3.50-5.40) 


 2.64 x10^6/uL


(3.50-5.40)


 


Hemoglobin


 9.1 g/dL


(12.0-15.5) 


 8.7 g/dL


(12.0-15.5)


 


Hematocrit


 27.5 %


(36.0-47.0) 


 26.3 %


(36.0-47.0)


 


Mean Corpuscular Volume


 99 fL () 


 


 100 fL


()


 


Mean Corpuscular Hemoglobin 33 pg (25-35)   33 pg (25-35) 


 


Mean Corpuscular Hemoglobin


Concent 33 g/dL


(31-37) 


 33 g/dL


(31-37)


 


Red Cell Distribution Width


 17.8 %


(11.5-14.5) 


 17.6 %


(11.5-14.5)


 


Platelet Count


 161 x10^3/uL


(140-400) 


 156 x10^3/uL


(140-400)


 


Neutrophils (%) (Auto) 83 % (31-73)   79 % (31-73) 


 


Lymphocytes (%) (Auto) 11 % (24-48)   15 % (24-48) 


 


Monocytes (%) (Auto) 4 % (0-9)   4 % (0-9) 


 


Eosinophils (%) (Auto) 1 % (0-3)   1 % (0-3) 


 


Basophils (%) (Auto) 1 % (0-3)   1 % (0-3) 


 


Neutrophils # (Auto)


 3.6 x10^3/uL


(1.8-7.7) 


 3.5 x10^3/uL


(1.8-7.7)


 


Lymphocytes # (Auto)


 0.5 x10^3/uL


(1.0-4.8) 


 0.6 x10^3/uL


(1.0-4.8)


 


Monocytes # (Auto)


 0.2 x10^3/uL


(0.0-1.1) 


 0.2 x10^3/uL


(0.0-1.1)


 


Eosinophils # (Auto)


 0.0 x10^3/uL


(0.0-0.7) 


 0.1 x10^3/uL


(0.0-0.7)


 


Basophils # (Auto)


 0.0 x10^3/uL


(0.0-0.2) 


 0.0 x10^3/uL


(0.0-0.2)


 


Sodium Level


 143 mmol/L


(136-145) 


 141 mmol/L


(136-145)


 


Potassium Level


 5.8 mmol/L


(3.5-5.1) 


 5.3 mmol/L


(3.5-5.1)


 


Chloride Level


 108 mmol/L


() 


 107 mmol/L


()


 


Carbon Dioxide Level


 21 mmol/L


(21-32) 


 19 mmol/L


(21-32)


 


Anion Gap 14 (6-14)   15 (6-14) 


 


Blood Urea Nitrogen


 103 mg/dL


(7-20) 


 102 mg/dL


(7-20)


 


Creatinine


 14.6 mg/dL


(0.6-1.0) 


 14.6 mg/dL


(0.6-1.0)


 


Estimated GFR


(Cockcroft-Gault) 3.1 


 


 3.1 





 


BUN/Creatinine Ratio 7 (6-20)   


 


Glucose Level


 92 mg/dL


(70-99) 


 86 mg/dL


(70-99)


 


Calcium Level


 9.2 mg/dL


(8.5-10.1) 


 9.3 mg/dL


(8.5-10.1)


 


Magnesium Level


 2.0 mg/dL


(1.8-2.4) 


 





 


Total Bilirubin


 0.4 mg/dL


(0.2-1.0) 


 





 


Aspartate Amino Transf


(AST/SGOT) 13 U/L (15-37) 


 


 





 


Alanine Aminotransferase


(ALT/SGPT) 15 U/L (14-59) 


 


 





 


Alkaline Phosphatase


 73 U/L


() 


 





 


Troponin I Quantitative


 < 0.017 ng/mL


(0.000-0.055) 


 





 


Total Protein


 7.4 g/dL


(6.4-8.2) 


 





 


Albumin


 3.1 g/dL


(3.4-5.0) 


 





 


Albumin/Globulin Ratio 0.7 (1.0-1.7)   


 


Urine Collection Type  Unknown  


 


Urine Color  Yellow  


 


Urine Clarity  Clear  


 


Urine pH  7.0  


 


Urine Specific Gravity  1.015  


 


Urine Protein


 


 100 mg/dL


(NEG-TRACE) 





 


Urine Glucose (UA)


 


 Negative mg/dL


(NEG) 





 


Urine Ketones (Stick)


 


 Negative mg/dL


(NEG) 





 


Urine Blood  Negative (NEG)  


 


Urine Nitrite  Negative (NEG)  


 


Urine Bilirubin  Negative (NEG)  


 


Urine Urobilinogen Dipstick


 


 0.2 mg/dL (0.2


mg/dL) 





 


Urine Leukocyte Esterase  Negative (NEG)  


 


Urine RBC  0 /HPF (0-2)  


 


Urine WBC  0 /HPF (0-4)  


 


Urine Squamous Epithelial


Cells 


 Many /LPF 


 





 


Urine Amorphous Sediment  Present /HPF  


 


Urine Bacteria  0 /HPF (0-FEW)  


 


Urine Mucus  Slight /LPF  


 


Hepatitis B Surface Antigen


 


 


 Nonreactive


(Nonreactive)








Laboratory Tests








Test


 1/25/20


17:50 1/26/20


03:30 1/26/20


04:30


 


White Blood Count


 4.3 x10^3/uL


(4.0-11.0) 


 4.4 x10^3/uL


(4.0-11.0)


 


Red Blood Count


 2.78 x10^6/uL


(3.50-5.40) 


 2.64 x10^6/uL


(3.50-5.40)


 


Hemoglobin


 9.1 g/dL


(12.0-15.5) 


 8.7 g/dL


(12.0-15.5)


 


Hematocrit


 27.5 %


(36.0-47.0) 


 26.3 %


(36.0-47.0)


 


Mean Corpuscular Volume


 99 fL () 


 


 100 fL


()


 


Mean Corpuscular Hemoglobin 33 pg (25-35)   33 pg (25-35) 


 


Mean Corpuscular Hemoglobin


Concent 33 g/dL


(31-37) 


 33 g/dL


(31-37)


 


Red Cell Distribution Width


 17.8 %


(11.5-14.5) 


 17.6 %


(11.5-14.5)


 


Platelet Count


 161 x10^3/uL


(140-400) 


 156 x10^3/uL


(140-400)


 


Neutrophils (%) (Auto) 83 % (31-73)   79 % (31-73) 


 


Lymphocytes (%) (Auto) 11 % (24-48)   15 % (24-48) 


 


Monocytes (%) (Auto) 4 % (0-9)   4 % (0-9) 


 


Eosinophils (%) (Auto) 1 % (0-3)   1 % (0-3) 


 


Basophils (%) (Auto) 1 % (0-3)   1 % (0-3) 


 


Neutrophils # (Auto)


 3.6 x10^3/uL


(1.8-7.7) 


 3.5 x10^3/uL


(1.8-7.7)


 


Lymphocytes # (Auto)


 0.5 x10^3/uL


(1.0-4.8) 


 0.6 x10^3/uL


(1.0-4.8)


 


Monocytes # (Auto)


 0.2 x10^3/uL


(0.0-1.1) 


 0.2 x10^3/uL


(0.0-1.1)


 


Eosinophils # (Auto)


 0.0 x10^3/uL


(0.0-0.7) 


 0.1 x10^3/uL


(0.0-0.7)


 


Basophils # (Auto)


 0.0 x10^3/uL


(0.0-0.2) 


 0.0 x10^3/uL


(0.0-0.2)


 


Sodium Level


 143 mmol/L


(136-145) 


 141 mmol/L


(136-145)


 


Potassium Level


 5.8 mmol/L


(3.5-5.1) 


 5.3 mmol/L


(3.5-5.1)


 


Chloride Level


 108 mmol/L


() 


 107 mmol/L


()


 


Carbon Dioxide Level


 21 mmol/L


(21-32) 


 19 mmol/L


(21-32)


 


Anion Gap 14 (6-14)   15 (6-14) 


 


Blood Urea Nitrogen


 103 mg/dL


(7-20) 


 102 mg/dL


(7-20)


 


Creatinine


 14.6 mg/dL


(0.6-1.0) 


 14.6 mg/dL


(0.6-1.0)


 


Estimated GFR


(Cockcroft-Gault) 3.1 


 


 3.1 





 


BUN/Creatinine Ratio 7 (6-20)   


 


Glucose Level


 92 mg/dL


(70-99) 


 86 mg/dL


(70-99)


 


Calcium Level


 9.2 mg/dL


(8.5-10.1) 


 9.3 mg/dL


(8.5-10.1)


 


Magnesium Level


 2.0 mg/dL


(1.8-2.4) 


 





 


Total Bilirubin


 0.4 mg/dL


(0.2-1.0) 


 





 


Aspartate Amino Transf


(AST/SGOT) 13 U/L (15-37) 


 


 





 


Alanine Aminotransferase


(ALT/SGPT) 15 U/L (14-59) 


 


 





 


Alkaline Phosphatase


 73 U/L


() 


 





 


Troponin I Quantitative


 < 0.017 ng/mL


(0.000-0.055) 


 





 


Total Protein


 7.4 g/dL


(6.4-8.2) 


 





 


Albumin


 3.1 g/dL


(3.4-5.0) 


 





 


Albumin/Globulin Ratio 0.7 (1.0-1.7)   


 


Urine Collection Type  Unknown  


 


Urine Color  Yellow  


 


Urine Clarity  Clear  


 


Urine pH  7.0  


 


Urine Specific Gravity  1.015  


 


Urine Protein


 


 100 mg/dL


(NEG-TRACE) 





 


Urine Glucose (UA)


 


 Negative mg/dL


(NEG) 





 


Urine Ketones (Stick)


 


 Negative mg/dL


(NEG) 





 


Urine Blood  Negative (NEG)  


 


Urine Nitrite  Negative (NEG)  


 


Urine Bilirubin  Negative (NEG)  


 


Urine Urobilinogen Dipstick


 


 0.2 mg/dL (0.2


mg/dL) 





 


Urine Leukocyte Esterase  Negative (NEG)  


 


Urine RBC  0 /HPF (0-2)  


 


Urine WBC  0 /HPF (0-4)  


 


Urine Squamous Epithelial


Cells 


 Many /LPF 


 





 


Urine Amorphous Sediment  Present /HPF  


 


Urine Bacteria  0 /HPF (0-FEW)  


 


Urine Mucus  Slight /LPF  


 


Hepatitis B Surface Antigen


 


 


 Nonreactive


(Nonreactive)











VTE Prophylaxis Ordered


VTE Prophylaxis Devices:  Yes


VTE Pharmacological Prophylaxi:  Contraindicated





Assessment/Plan


Assessment/Plan


Impression:  





   Hyperkalemia


   EXTREME MORBID OBESITY


   NONCOMPLIANCE WITH DIALYSIS


   ESRD (end stage renal disease) DIALYSIS DEPENDENT


   HYPERTENSION


   NORMOCYTIC ANEMIA OF RENAL FAILURE











ADMITTED 





[facility] BED


NEPHROLOGY CONSULT


BP CONTROL











57 MIN PT EXAM, CHART REVIEW, > 50% OF TIME SPENT WITH EXAM, CHART REVIEW, PT 

CARE COORDINATION











KASSI FERNANDEZ MD          Jan 26, 2020 09:57

## 2020-01-27 VITALS — DIASTOLIC BLOOD PRESSURE: 67 MMHG | SYSTOLIC BLOOD PRESSURE: 144 MMHG

## 2020-01-27 VITALS — DIASTOLIC BLOOD PRESSURE: 70 MMHG | SYSTOLIC BLOOD PRESSURE: 148 MMHG

## 2020-01-27 VITALS — SYSTOLIC BLOOD PRESSURE: 113 MMHG | DIASTOLIC BLOOD PRESSURE: 58 MMHG

## 2020-01-27 VITALS — DIASTOLIC BLOOD PRESSURE: 75 MMHG | SYSTOLIC BLOOD PRESSURE: 144 MMHG

## 2020-01-27 VITALS — DIASTOLIC BLOOD PRESSURE: 89 MMHG | SYSTOLIC BLOOD PRESSURE: 169 MMHG

## 2020-01-27 VITALS — DIASTOLIC BLOOD PRESSURE: 71 MMHG | SYSTOLIC BLOOD PRESSURE: 150 MMHG

## 2020-01-27 LAB
ALBUMIN SERPL-MCNC: 3.1 G/DL (ref 3.4–5)
ANION GAP SERPL CALC-SCNC: 11 MMOL/L (ref 6–14)
BASOPHILS # BLD AUTO: 0 X10^3/UL (ref 0–0.2)
BASOPHILS NFR BLD: 1 % (ref 0–3)
BUN SERPL-MCNC: 42 MG/DL (ref 7–20)
CALCIUM SERPL-MCNC: 8.9 MG/DL (ref 8.5–10.1)
CHLORIDE SERPL-SCNC: 102 MMOL/L (ref 98–107)
CO2 SERPL-SCNC: 27 MMOL/L (ref 21–32)
CREAT SERPL-MCNC: 8 MG/DL (ref 0.6–1)
EOSINOPHIL NFR BLD: 0.1 X10^3/UL (ref 0–0.7)
EOSINOPHIL NFR BLD: 1 % (ref 0–3)
ERYTHROCYTE [DISTWIDTH] IN BLOOD BY AUTOMATED COUNT: 17.1 % (ref 11.5–14.5)
GFR SERPLBLD BASED ON 1.73 SQ M-ARVRAT: 6.2 ML/MIN
GLUCOSE SERPL-MCNC: 90 MG/DL (ref 70–99)
HCT VFR BLD CALC: 28.1 % (ref 36–47)
HGB BLD-MCNC: 9.3 G/DL (ref 12–15.5)
LYMPHOCYTES # BLD: 0.8 X10^3/UL (ref 1–4.8)
LYMPHOCYTES NFR BLD AUTO: 19 % (ref 24–48)
MCH RBC QN AUTO: 32 PG (ref 25–35)
MCHC RBC AUTO-ENTMCNC: 33 G/DL (ref 31–37)
MCV RBC AUTO: 98 FL (ref 79–100)
MONO #: 0.3 X10^3/UL (ref 0–1.1)
MONOCYTES NFR BLD: 7 % (ref 0–9)
NEUT #: 3.1 X10^3/UL (ref 1.8–7.7)
NEUTROPHILS NFR BLD AUTO: 72 % (ref 31–73)
PHOSPHATE SERPL-MCNC: 4.1 MG/DL (ref 2.6–4.7)
PLATELET # BLD AUTO: 185 X10^3/UL (ref 140–400)
POTASSIUM SERPL-SCNC: 4.1 MMOL/L (ref 3.5–5.1)
RBC # BLD AUTO: 2.88 X10^6/UL (ref 3.5–5.4)
SODIUM SERPL-SCNC: 140 MMOL/L (ref 136–145)
WBC # BLD AUTO: 4.3 X10^3/UL (ref 4–11)

## 2020-01-27 PROCEDURE — 5A1D70Z PERFORMANCE OF URINARY FILTRATION, INTERMITTENT, LESS THAN 6 HOURS PER DAY: ICD-10-PCS | Performed by: INTERNAL MEDICINE

## 2020-01-27 RX ADMIN — CARVEDILOL SCH MG: 12.5 TABLET, FILM COATED ORAL at 17:00

## 2020-01-27 RX ADMIN — HEPARIN SODIUM SCH UNIT: 5000 INJECTION, SOLUTION INTRAVENOUS; SUBCUTANEOUS at 05:49

## 2020-01-27 RX ADMIN — HEPARIN SODIUM SCH UNIT: 5000 INJECTION, SOLUTION INTRAVENOUS; SUBCUTANEOUS at 20:43

## 2020-01-27 RX ADMIN — HEPARIN SODIUM SCH UNIT: 5000 INJECTION, SOLUTION INTRAVENOUS; SUBCUTANEOUS at 15:54

## 2020-01-27 RX ADMIN — CARVEDILOL SCH MG: 12.5 TABLET, FILM COATED ORAL at 15:50

## 2020-01-27 RX ADMIN — DOXAZOSIN SCH MG: 1 TABLET ORAL at 20:35

## 2020-01-27 NOTE — NUR
SS following for discharge planning. SS reviewed pt chart. Pt is from home and is currently 
on room air. PT/OT ordered. SS will continue to follow for discharge planning.

## 2020-01-27 NOTE — PDOC
Renal-Progress Notes


Subjective Notes


Notes


NO NEW COMPLAINTS





History of Present Illness


Hx of present illness


STABLE





Vitals


Vitals





Vital Signs








  Date Time  Temp Pulse Resp B/P (MAP) Pulse Ox O2 Delivery O2 Flow Rate FiO2


 


1/27/20 10:52 98.1 78 18 150/71 (97) 96 Room Air  





 98.1       








Weight


Weight [ ]





I.O.


Intake and Output











Intake and Output 


 


 1/27/20





 07:00


 


Intake Total 880 ml


 


Output Total 100 ml


 


Balance 780 ml


 


 


 


Intake Oral 880 ml


 


Output Urine Total 100 ml











Labs


Labs





Laboratory Tests








Test


 1/27/20


06:20


 


White Blood Count


 4.3 x10^3/uL


(4.0-11.0)


 


Red Blood Count


 2.88 x10^6/uL


(3.50-5.40)


 


Hemoglobin


 9.3 g/dL


(12.0-15.5)


 


Hematocrit


 28.1 %


(36.0-47.0)


 


Mean Corpuscular Volume 98 fL () 


 


Mean Corpuscular Hemoglobin 32 pg (25-35) 


 


Mean Corpuscular Hemoglobin


Concent 33 g/dL


(31-37)


 


Red Cell Distribution Width


 17.1 %


(11.5-14.5)


 


Platelet Count


 185 x10^3/uL


(140-400)


 


Neutrophils (%) (Auto) 72 % (31-73) 


 


Lymphocytes (%) (Auto) 19 % (24-48) 


 


Monocytes (%) (Auto) 7 % (0-9) 


 


Eosinophils (%) (Auto) 1 % (0-3) 


 


Basophils (%) (Auto) 1 % (0-3) 


 


Neutrophils # (Auto)


 3.1 x10^3/uL


(1.8-7.7)


 


Lymphocytes # (Auto)


 0.8 x10^3/uL


(1.0-4.8)


 


Monocytes # (Auto)


 0.3 x10^3/uL


(0.0-1.1)


 


Eosinophils # (Auto)


 0.1 x10^3/uL


(0.0-0.7)


 


Basophils # (Auto)


 0.0 x10^3/uL


(0.0-0.2)


 


Sodium Level


 140 mmol/L


(136-145)


 


Potassium Level


 4.1 mmol/L


(3.5-5.1)


 


Chloride Level


 102 mmol/L


()


 


Carbon Dioxide Level


 27 mmol/L


(21-32)


 


Anion Gap 11 (6-14) 


 


Blood Urea Nitrogen


 42 mg/dL


(7-20)


 


Creatinine


 8.0 mg/dL


(0.6-1.0)


 


Estimated GFR


(Cockcroft-Gault) 6.2 





 


Glucose Level


 90 mg/dL


(70-99)


 


Calcium Level


 8.9 mg/dL


(8.5-10.1)


 


Phosphorus Level


 4.1 mg/dL


(2.6-4.7)


 


Albumin


 3.1 g/dL


(3.4-5.0)











Review of Systems


Constitutional:  yes: weakness, alert, oriented


Ears/Nose/Throat:  Yes: no symptom reported


Eyes:  Yes: no symptom reported


Pulmonary:  Yes no symptom reported


Cardiovascular:  Yes no symptom reported


Gastrointestional:  Yes: nausea


Genitourinary:  Yes: no symptom reported


Musculoskeletal:  Yes: muscle stiffness


Skin:  Yes no symptom reported


Psychiatric/Neurological:  Yes: no symptom reported


Endocrine:  Yes: no symptom reported





Physical Exam


General Appearance:  no apparent distress


Skin:  warm


Respiratory:  decreased breath sounds


Heart:  S1S2


Abdomen:  soft


Genitourinary:  bladder flat


Extremities:  pulses present


Neurology:  alert, oriented





Assessment


Assessment


IMP





HYPERKALEMIA


ANEMIA


HTN


ESRD


NON COMPLIANCE





PLAN





ENC COMPLIANCE


STEVEN WHEN NEEDED


HD TODAY


UF TO DW


WILL FOLLOW




















ZORAN WONG MD                 Jan 27, 2020 12:05

## 2020-01-27 NOTE — PDOC
[facility] PROGRESS NOTE


Chief Complaint


Chief Complaint


Hyperkalemia


ESRD


Hypertension 


Normocytic anemia





History of Present Illness


History of Present Illness


1/27/20


Patient seen and examined


Patient is laying in bed


She is doing better today


She doesn't have any new complaints


Patient requested Benadryl 


DW nurse





Vitals/I&O


Vitals/I&O:





                                   Vital Signs








  Date Time  Temp Pulse Resp B/P (MAP) Pulse Ox O2 Delivery O2 Flow Rate FiO2


 


1/27/20 10:52 98.1 78 18 150/71 (97) 96 Room Air  





 98.1       














                                    I & O   


 


 1/26/20 1/26/20 1/27/20





 15:00 23:00 07:00


 


Intake Total  400 ml 480 ml


 


Output Total  0 ml 100 ml


 


Balance  400 ml 380 ml











Physical Exam


General:  Alert, Oriented X3, Cooperative, No acute distress


Lungs:  Clear


Abdomen:  Soft


Extremities:  No cyanosis





Labs


Labs:





Laboratory Tests








Test


 1/27/20


06:20


 


White Blood Count


 4.3 x10^3/uL


(4.0-11.0)


 


Red Blood Count


 2.88 x10^6/uL


(3.50-5.40)


 


Hemoglobin


 9.3 g/dL


(12.0-15.5)


 


Hematocrit


 28.1 %


(36.0-47.0)


 


Mean Corpuscular Volume 98 fL () 


 


Mean Corpuscular Hemoglobin 32 pg (25-35) 


 


Mean Corpuscular Hemoglobin


Concent 33 g/dL


(31-37)


 


Red Cell Distribution Width


 17.1 %


(11.5-14.5)


 


Platelet Count


 185 x10^3/uL


(140-400)


 


Neutrophils (%) (Auto) 72 % (31-73) 


 


Lymphocytes (%) (Auto) 19 % (24-48) 


 


Monocytes (%) (Auto) 7 % (0-9) 


 


Eosinophils (%) (Auto) 1 % (0-3) 


 


Basophils (%) (Auto) 1 % (0-3) 


 


Neutrophils # (Auto)


 3.1 x10^3/uL


(1.8-7.7)


 


Lymphocytes # (Auto)


 0.8 x10^3/uL


(1.0-4.8)


 


Monocytes # (Auto)


 0.3 x10^3/uL


(0.0-1.1)


 


Eosinophils # (Auto)


 0.1 x10^3/uL


(0.0-0.7)


 


Basophils # (Auto)


 0.0 x10^3/uL


(0.0-0.2)


 


Sodium Level


 140 mmol/L


(136-145)


 


Potassium Level


 4.1 mmol/L


(3.5-5.1)


 


Chloride Level


 102 mmol/L


()


 


Carbon Dioxide Level


 27 mmol/L


(21-32)


 


Anion Gap 11 (6-14) 


 


Blood Urea Nitrogen


 42 mg/dL


(7-20)


 


Creatinine


 8.0 mg/dL


(0.6-1.0)


 


Estimated GFR


(Cockcroft-Gault) 6.2 





 


Glucose Level


 90 mg/dL


(70-99)


 


Calcium Level


 8.9 mg/dL


(8.5-10.1)


 


Phosphorus Level


 4.1 mg/dL


(2.6-4.7)


 


Albumin


 3.1 g/dL


(3.4-5.0)











Review of Systems


Review of Systems:


Denies N/V


Denies SOB





Assessment and Plan


Assessmemt and Plan


Problems


Medical Problems:


(1) ESRD (end stage renal disease)


Status: Acute  





(2) Hyperkalemia


Status: Acute  





Assessment


Hyperkalemia


ESRD


Hypertension 


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


Noncompliant with dialysis





Plan


Nephrology folowing


HD per Nephrology


Trend labs


Benadryl 50 mg IV PRN


DC per Nephrology approval














Comment


Review of Relevant


I have reviewed the following items [place] (where applicable) has been applied.


Medications:





Current Medications








 Medications


  (Trade)  Dose


 Ordered  Sig/Varun


 Route


 PRN Reason  Start Time


 Stop Time Status Last Admin


Dose Admin


 


 Heparin Sodium


  (Porcine)


  (Heparin Sodium)  5,000 unit  Q8HRS


 SQ


   1/26/20 14:00


    1/27/20 05:49





 


 Doxazosin Mesylate


  (Cardura)  2 mg  QHS


 PO


   1/26/20 21:00


    1/26/20 20:38





 


 Alprazolam


  (Xanax)  0.25 mg  BID  PRN


 PO


 ANXIETY / AGITATION  1/26/20 15:30


    1/27/20 08:47




















MINERVA PINO III DO           Jan 27, 2020 11:59

## 2020-01-28 VITALS — DIASTOLIC BLOOD PRESSURE: 58 MMHG | SYSTOLIC BLOOD PRESSURE: 126 MMHG

## 2020-01-28 VITALS
DIASTOLIC BLOOD PRESSURE: 57 MMHG | DIASTOLIC BLOOD PRESSURE: 57 MMHG | SYSTOLIC BLOOD PRESSURE: 119 MMHG | DIASTOLIC BLOOD PRESSURE: 57 MMHG | SYSTOLIC BLOOD PRESSURE: 119 MMHG | SYSTOLIC BLOOD PRESSURE: 119 MMHG

## 2020-01-28 VITALS — SYSTOLIC BLOOD PRESSURE: 118 MMHG | DIASTOLIC BLOOD PRESSURE: 57 MMHG

## 2020-01-28 LAB
ANION GAP SERPL CALC-SCNC: 10 MMOL/L (ref 6–14)
BASOPHILS # BLD AUTO: 0 X10^3/UL (ref 0–0.2)
BASOPHILS NFR BLD: 1 % (ref 0–3)
BUN SERPL-MCNC: 16 MG/DL (ref 7–20)
CALCIUM SERPL-MCNC: 9.5 MG/DL (ref 8.5–10.1)
CHLORIDE SERPL-SCNC: 99 MMOL/L (ref 98–107)
CO2 SERPL-SCNC: 31 MMOL/L (ref 21–32)
CREAT SERPL-MCNC: 5.5 MG/DL (ref 0.6–1)
EOSINOPHIL NFR BLD: 0 X10^3/UL (ref 0–0.7)
EOSINOPHIL NFR BLD: 1 % (ref 0–3)
ERYTHROCYTE [DISTWIDTH] IN BLOOD BY AUTOMATED COUNT: 17.1 % (ref 11.5–14.5)
GFR SERPLBLD BASED ON 1.73 SQ M-ARVRAT: 9.6 ML/MIN
GLUCOSE SERPL-MCNC: 94 MG/DL (ref 70–99)
HCT VFR BLD CALC: 29.4 % (ref 36–47)
HGB BLD-MCNC: 9.9 G/DL (ref 12–15.5)
LYMPHOCYTES # BLD: 1 X10^3/UL (ref 1–4.8)
LYMPHOCYTES NFR BLD AUTO: 25 % (ref 24–48)
MCH RBC QN AUTO: 33 PG (ref 25–35)
MCHC RBC AUTO-ENTMCNC: 34 G/DL (ref 31–37)
MCV RBC AUTO: 97 FL (ref 79–100)
MONO #: 0.4 X10^3/UL (ref 0–1.1)
MONOCYTES NFR BLD: 10 % (ref 0–9)
NEUT #: 2.6 X10^3/UL (ref 1.8–7.7)
NEUTROPHILS NFR BLD AUTO: 63 % (ref 31–73)
PLATELET # BLD AUTO: 179 X10^3/UL (ref 140–400)
POTASSIUM SERPL-SCNC: 3.6 MMOL/L (ref 3.5–5.1)
RBC # BLD AUTO: 3.02 X10^6/UL (ref 3.5–5.4)
SODIUM SERPL-SCNC: 140 MMOL/L (ref 136–145)
WBC # BLD AUTO: 4.2 X10^3/UL (ref 4–11)

## 2020-01-28 RX ADMIN — CARVEDILOL SCH MG: 12.5 TABLET, FILM COATED ORAL at 09:00

## 2020-01-28 RX ADMIN — HEPARIN SODIUM SCH UNIT: 5000 INJECTION, SOLUTION INTRAVENOUS; SUBCUTANEOUS at 06:48

## 2020-01-28 NOTE — PDOC
[facility] [facility] PROGRESS NOTE


Chief Complaint


Chief Complaint


Hyperkalemia


ESRD


Hypertension 


Normocytic anemia





History of Present Illness


History of Present Illness


1/28/20


Patient seen and examined


Patient sitting in chair


She is doing well, with no new complaints


Doesn't have dialysis today


DW nurse





1/27/20


Patient seen and examined


Patient is laying in bed


She is doing better today


She doesn't have any new complaints


Patient requested Benadryl 


DW nurse





Vitals/I&O


Vitals/I&O:





                                   Vital Signs








  Date Time  Temp Pulse Resp B/P (MAP) Pulse Ox O2 Delivery O2 Flow Rate FiO2


 


1/28/20 09:01  75  118/57    


 


1/28/20 08:00      Room Air  


 


1/28/20 07:00 98.2  20  90   





 98.2       














                                    I & O   


 


 1/27/20 1/27/20 1/28/20





 15:00 23:00 07:00


 


Intake Total 120 ml 240 ml 480 ml


 


Output Total   0 ml


 


Balance 120 ml 240 ml 480 ml











Physical Exam


General:  Alert, Oriented X3, Cooperative, No acute distress


Lungs:  Clear


Abdomen:  Soft


Extremities:  No cyanosis





Labs


Labs:





Laboratory Tests








Test


 1/28/20


08:50


 


White Blood Count


 4.2 x10^3/uL


(4.0-11.0)


 


Red Blood Count


 3.02 x10^6/uL


(3.50-5.40)


 


Hemoglobin


 9.9 g/dL


(12.0-15.5)


 


Hematocrit


 29.4 %


(36.0-47.0)


 


Mean Corpuscular Volume 97 fL () 


 


Mean Corpuscular Hemoglobin 33 pg (25-35) 


 


Mean Corpuscular Hemoglobin


Concent 34 g/dL


(31-37)


 


Red Cell Distribution Width


 17.1 %


(11.5-14.5)


 


Platelet Count


 179 x10^3/uL


(140-400)


 


Neutrophils (%) (Auto) 63 % (31-73) 


 


Lymphocytes (%) (Auto) 25 % (24-48) 


 


Monocytes (%) (Auto) 10 % (0-9) 


 


Eosinophils (%) (Auto) 1 % (0-3) 


 


Basophils (%) (Auto) 1 % (0-3) 


 


Neutrophils # (Auto)


 2.6 x10^3/uL


(1.8-7.7)


 


Lymphocytes # (Auto)


 1.0 x10^3/uL


(1.0-4.8)


 


Monocytes # (Auto)


 0.4 x10^3/uL


(0.0-1.1)


 


Eosinophils # (Auto)


 0.0 x10^3/uL


(0.0-0.7)


 


Basophils # (Auto)


 0.0 x10^3/uL


(0.0-0.2)


 


Sodium Level


 140 mmol/L


(136-145)


 


Potassium Level


 3.6 mmol/L


(3.5-5.1)


 


Chloride Level


 99 mmol/L


()


 


Carbon Dioxide Level


 31 mmol/L


(21-32)


 


Anion Gap 10 (6-14) 


 


Blood Urea Nitrogen


 16 mg/dL


(7-20)


 


Creatinine


 5.5 mg/dL


(0.6-1.0)


 


Estimated GFR


(Cockcroft-Gault) 9.6 





 


Glucose Level


 94 mg/dL


(70-99)


 


Calcium Level


 9.5 mg/dL


(8.5-10.1)











Review of Systems


Review of Systems:


Denies N/V


Denies SOB





Assessment and Plan


Assessmemt and Plan


Problems


Medical Problems:


(1) ESRD (end stage renal disease)


Status: Acute  





(2) Hyperkalemia


Status: Acute  





Assessment


Hyperkalemia


ESRD


Hypertension 


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


Noncompliant with dialysis





Plan


Nephrology folowing


HD per Nephrology


Trend labs


Full code


DC per Nephrology approval








Comment


Review of Relevant


I have reviewed the following items [place] (where applicable) has been applied.


Medications:





Current Medications








 Medications


  (Trade)  Dose


 Ordered  Sig/Varun


 Route


 PRN Reason  Start Time


 Stop Time Status Last Admin


Dose Admin


 


 Darbepoetin Kuldip


  (ARANESP for


 DIALYSIS PTS)  60 mcg  WEEKLYHS


 SQ


   1/27/20 21:00


    1/27/20 20:36




















MINERVA PINO III DO           Jan 28, 2020 10:32

## 2020-01-28 NOTE — PDOC
Renal-Progress Notes


Subjective Notes


Notes


FEELS WELL





History of Present Illness


Hx of present illness


STABLE





Vitals


Vitals





Vital Signs








  Date Time  Temp Pulse Resp B/P (MAP) Pulse Ox O2 Delivery O2 Flow Rate FiO2


 


1/28/20 09:01  75  118/57    


 


1/28/20 08:00      Room Air  


 


1/28/20 07:00 98.2  20  90   





 98.2       








Weight


Weight [ ]





I.O.


Intake and Output











Intake and Output 


 


 1/28/20





 07:00


 


Intake Total 840 ml


 


Output Total 0 ml


 


Balance 840 ml


 


 


 


Intake Oral 840 ml


 


Output Urine Total 0 ml


 


# Voids 2











Labs


Labs





Laboratory Tests








Test


 1/28/20


08:50


 


White Blood Count


 4.2 x10^3/uL


(4.0-11.0)


 


Red Blood Count


 3.02 x10^6/uL


(3.50-5.40)


 


Hemoglobin


 9.9 g/dL


(12.0-15.5)


 


Hematocrit


 29.4 %


(36.0-47.0)


 


Mean Corpuscular Volume 97 fL () 


 


Mean Corpuscular Hemoglobin 33 pg (25-35) 


 


Mean Corpuscular Hemoglobin


Concent 34 g/dL


(31-37)


 


Red Cell Distribution Width


 17.1 %


(11.5-14.5)


 


Platelet Count


 179 x10^3/uL


(140-400)


 


Neutrophils (%) (Auto) 63 % (31-73) 


 


Lymphocytes (%) (Auto) 25 % (24-48) 


 


Monocytes (%) (Auto) 10 % (0-9) 


 


Eosinophils (%) (Auto) 1 % (0-3) 


 


Basophils (%) (Auto) 1 % (0-3) 


 


Neutrophils # (Auto)


 2.6 x10^3/uL


(1.8-7.7)


 


Lymphocytes # (Auto)


 1.0 x10^3/uL


(1.0-4.8)


 


Monocytes # (Auto)


 0.4 x10^3/uL


(0.0-1.1)


 


Eosinophils # (Auto)


 0.0 x10^3/uL


(0.0-0.7)


 


Basophils # (Auto)


 0.0 x10^3/uL


(0.0-0.2)


 


Sodium Level


 140 mmol/L


(136-145)


 


Potassium Level


 3.6 mmol/L


(3.5-5.1)


 


Chloride Level


 99 mmol/L


()


 


Carbon Dioxide Level


 31 mmol/L


(21-32)


 


Anion Gap 10 (6-14) 


 


Blood Urea Nitrogen


 16 mg/dL


(7-20)


 


Creatinine


 5.5 mg/dL


(0.6-1.0)


 


Estimated GFR


(Cockcroft-Gault) 9.6 





 


Glucose Level


 94 mg/dL


(70-99)


 


Calcium Level


 9.5 mg/dL


(8.5-10.1)











Review of Systems


Constitutional:  yes: weakness, alert, oriented


Ears/Nose/Throat:  Yes: no symptom reported


Eyes:  Yes: no symptom reported


Pulmonary:  Yes no symptom reported


Cardiovascular:  Yes no symptom reported


Gastrointestional:  Yes: nausea


Genitourinary:  Yes: no symptom reported


Musculoskeletal:  Yes: muscle stiffness


Skin:  Yes no symptom reported


Psychiatric/Neurological:  Yes: no symptom reported


Endocrine:  Yes: no symptom reported





Physical Exam


General Appearance:  no apparent distress


Skin:  warm


Respiratory:  decreased breath sounds


Heart:  S1S2


Abdomen:  soft


Genitourinary:  bladder flat


Extremities:  pulses present


Neurology:  alert, oriented





Assessment


Assessment


IMP





HYPERKALEMIA-RESOLVED


ANEMIA


HTN


ESRD


NON COMPLIANCE





PLAN





ENC COMPLIANCE


STEVEN WHEN NEEDED


HD TOMORROW


OK TO D/C FROM [facility]


WILL FOLLOW




















ZORAN WONG MD                 Jan 28, 2020 10:34

## 2020-01-28 NOTE — NUR
Discharge Note:



NAOMIE HENSLEY



Discharge instructions and discharge home medications reviewed with Patient and a copy 
given. All questions have been answered and understanding verbalized.

## 2020-11-03 ENCOUNTER — HOSPITAL ENCOUNTER (OUTPATIENT)
Dept: HOSPITAL 61 - US | Age: 60
Discharge: HOME | End: 2020-11-03
Attending: SURGERY
Payer: MEDICARE

## 2020-11-03 DIAGNOSIS — N18.6: ICD-10-CM

## 2020-11-03 DIAGNOSIS — E78.00: ICD-10-CM

## 2020-11-03 DIAGNOSIS — Z91.040: ICD-10-CM

## 2020-11-03 DIAGNOSIS — F41.9: ICD-10-CM

## 2020-11-03 DIAGNOSIS — Z72.89: ICD-10-CM

## 2020-11-03 DIAGNOSIS — Z87.891: ICD-10-CM

## 2020-11-03 DIAGNOSIS — D05.11: ICD-10-CM

## 2020-11-03 DIAGNOSIS — E66.9: ICD-10-CM

## 2020-11-03 DIAGNOSIS — Z91.041: ICD-10-CM

## 2020-11-03 DIAGNOSIS — Z79.899: ICD-10-CM

## 2020-11-03 DIAGNOSIS — G47.30: ICD-10-CM

## 2020-11-03 DIAGNOSIS — F32.9: ICD-10-CM

## 2020-11-03 DIAGNOSIS — I13.2: ICD-10-CM

## 2020-11-03 DIAGNOSIS — I50.9: ICD-10-CM

## 2020-11-03 DIAGNOSIS — N63.12: Primary | ICD-10-CM

## 2020-11-03 DIAGNOSIS — M19.90: ICD-10-CM

## 2020-11-03 PROCEDURE — 88341 IMHCHEM/IMCYTCHM EA ADD ANTB: CPT

## 2020-11-03 PROCEDURE — 19081 BX BREAST 1ST LESION STRTCTC: CPT

## 2020-11-03 PROCEDURE — 88342 IMHCHEM/IMCYTCHM 1ST ANTB: CPT

## 2020-11-03 PROCEDURE — 88305 TISSUE EXAM BY PATHOLOGIST: CPT

## 2020-11-03 PROCEDURE — 19083 BX BREAST 1ST LESION US IMAG: CPT

## 2020-11-03 PROCEDURE — 88361 TUMOR IMMUNOHISTOCHEM/COMPUT: CPT

## 2020-11-03 PROCEDURE — 77065 DX MAMMO INCL CAD UNI: CPT

## 2020-11-03 PROCEDURE — C1713 ANCHOR/SCREW BN/BN,TIS/BN: HCPCS

## 2020-11-03 PROCEDURE — 76942 ECHO GUIDE FOR BIOPSY: CPT

## 2020-11-03 NOTE — RAD
EXAM: Sonographic guided right breast biopsy; right breast biopsy clip 

placement; right breast postbiopsy mammogram.

 

HISTORY: 60-year-old female presents for sonographic guided biopsy of a 

right breast mass demonstrated on a study performed at an outside 

facility.

 

TECHNIQUE: The risks of the procedure discussed with the patient and 

written and verbal chest was obtained. Timeout was performed. Sonographic 

imaging of the right breast was performed and the lesion of concern at the

1:00 position measuring approximately 2.0 cm was identified. This 

demonstrates internal blood flow and irregular margins and is highly 

concerning for malignancy. The skin in this location was sterilely 

prepped, draped and infiltrated with 1 percent lidocaine. Multiple core 

biopsies were obtained through the lesion of concern with sonographic 

guidance. A biopsy clip was advanced into the lesion with sonographic 

guidance. Minimal compression was maintained in a sterile bandage was 

placed. A postbiopsy mammogram demonstrates the biopsy clip in expected 

position. There is a mass with architectural distortion and suspicious 

calcifications in this location. The patient tolerated the procedure 

without difficulty and was discharged in stable condition.

 

IMPRESSION: Successful sonographic and biopsy of a suspicious mass with 

associated calcifications within the 1:00 position of the right breast and

possibly clip placement. An addendum to this report will be submitted when

pathology results are available.

 

Electronically signed by: Celeste Romero MD (11/3/2020 10:35 AM) [id number]

## 2020-11-03 NOTE — RAD
EXAM: Sonographic guided right breast biopsy; right breast biopsy clip 

placement; right breast postbiopsy mammogram.

 

HISTORY: 60-year-old female presents for sonographic guided biopsy of a 

right breast mass demonstrated on a study performed at an outside 

facility.

 

TECHNIQUE: The risks of the procedure were discussed with the patient and 

written and verbal consent was obtained. Timeout was performed. 

Sonographic imaging of the right breast was performed and the lesion of 

concern at the 1:00 position measuring approximately 2.0 cm was 

identified. This demonstrates internal blood flow and irregular margins 

and is highly concerning for malignancy. The skin in this location was 

sterilely prepped, draped and infiltrated with 1 percent lidocaine. 

Multiple core biopsies were obtained through the lesion of concern with 

sonographic guidance. A biopsy clip was advanced into the lesion with 

sonographic guidance. Minimal compression was maintained in a sterile 

bandage was placed. A postbiopsy mammogram demonstrates the biopsy clip in

expected position. There is a mass with architectural distortion and 

suspicious calcifications in this location. The patient tolerated the 

procedure without difficulty and was discharged in stable condition.

 

IMPRESSION: Successful sonographic and biopsy of a suspicious mass with 

associated calcifications within the 1:00 position of the right breast and

possibly clip placement. An addendum to this report will be submitted when

pathology results are available.

 

Electronically signed by: Celeste Romero MD (11/3/2020 11:27 AM) [id number]

## 2020-11-05 NOTE — PATHOLOGY
[facility] Accession Number: [id number]

.                                                                01

Material submitted:                                        .

breast - RIGHT BREAST MASS BIOPSY. Modifiers: right

.                                                                02

**********************************************************************

Diagnosis:

"RT breast mass BX", biopsy:

- INVASIVE DUCTAL CARCINOMA WITH LOBULAR FEATURES, MODERATELY

DIFFERENTIATED, GRADE II, INVOLVING MULTIPLE CORES, LONGEST CONTIGUOUS

FOCUS MEASURING 1.5 CM ON THE SLIDE. (SEE COMMENT)

(CLW:hali; 11/04/2020)

S  11/05/2020  0956 Local

**********************************************************************

.                                                                02

Comment:

.

Specimen type:  Needle biopsy;

Tumor site:   Rt breast mass;

Histologic type:  Invasive ductal carcinoma with focal lobular features;

Histologic grade:  Grade II, moderately differentiated;

Tumor quantitation:  Involving multiple cores, longest contiguous focus

measuring 1.5 cm on the slide;

Tubules, nuclei and mitoses:  Tubules score - 2-3, nuclei score - 2,

mitoses score - 1;

LVSI:  Not identified;

Microcalcifications:  Not identified;

Prognostic Markers:  ER, [place], HER2 and Ki-67;

Block: Pending on block A1.

.

Properly controlled immunohistochemical stains are performed.

.

Block A2:

AE1/AE3 - tumor cells reactive;

E-cadherin - tumor cells diffusely reactive.

.

Representative slides are co-reviewed with Dr. Tex Blevins.  Clinical

and radiographic correlation is recommended.  The case is discussed with

Zulema in the office of Dr. Peter on 11/05/2020 at 9:50 AM.

.

(CLW:hali; 11/04/2020)

.                                                                02

Electronically signed:                                     .

Lilian Hurtado MD, Pathologist

NPI- [id number]

.                                                                01

Gross description:                                         .

The specimen is received in formalin, labeled "Green, Mare, right

breast biopsy" and consists of 4 needle cores of yellow tissue measuring

between 1.4 cm and 1.7 cm in length and 0.2 cm each in diameter which are

entirely submitted in A1-A3.  The specimen was obtained at 10:42 AM on

11/03/2020 and placed in formalin and 10:43 AM.  The cold ischemic time is

1 minute and the total formalin fixation time is greater than 6 hours less

than 72 hours.

(SDY; 11/3/2020)

SYU/SYU  11/03/2020  1743 Local

.                                                                02

Pathologist provided ICD-10:

C50.911

.                                                                02

CPT                                                        .

062040, J73985, C84240

Specimen Comment: A courtesy copy of this report has been sent to [phone]

Specimen Comment: Report sent to DR PETER / DR REZA

***Performed at:  01

   [facility]

   [address], [place]  [id number]

   MD Tex Blevins MD Phone:  [phone]

***Performed at:  02

   [facility]

   [address], [place]  [id number]

   MD See Rader MD Phone:  [phone]

## 2021-02-02 ENCOUNTER — HOSPITAL ENCOUNTER (OUTPATIENT)
Dept: HOSPITAL 61 - MRI | Age: 61
End: 2021-02-02
Attending: OBSTETRICS & GYNECOLOGY
Payer: COMMERCIAL

## 2021-02-02 DIAGNOSIS — R19.00: ICD-10-CM

## 2021-02-02 DIAGNOSIS — N83.202: Primary | ICD-10-CM

## 2021-02-02 PROCEDURE — 72195 MRI PELVIS W/O DYE: CPT

## 2021-02-04 NOTE — RAD
MRI pelvis without contrast



HISTORY: 60-year-old postmenopausal female with adnexal mass.



Comparisons: None available at time of exam.



FINDINGS: Absence of contrast decreases the ability to differentiate between solid enhancing neoplast
ic masses versus nonenhancing masses.



Anteverted uterus. Cervix is normal. There is abnormal heterogeneous T2-weighted hyperintense expansi
on of the endometrium with a maximum thickness of 1.6 cm, there is no T1 hyperintense signal of the e
ndometrium to suggest that this is blood product, this raises the possibility of endometrial hyperpla
maria or carcinoma in a postmenopausal female. There is no obvious invasion of the inner myometrium by 
the expanded endometrium. No myometrial masses of the uterus.



The right ovary is essentially replaced by a mass which measures 9 cm craniocaudal by 7.8 cm AP by 4.
3 cm transverse, this mass demonstrates extensive fat signal throughout the mass, there are some tiny
 nonfatty subcentimeter T2-weighted hyperintense foci of the mass inferiorly, as well as a small focu
s of fluid signal intensity along the anterior-inferior margin, this mass replaces substance of the r
ight ovary and is most typical of a mature cystic teratoma.



The left ovary is essentially replaced by cystic change the dominant cyst measuring 4.7 x 2.6 x 3.1 c
m with 2 thin internal septations of the cyst which demonstrates several fluid signal intensity. Ther
e are 2 separate smaller unilocular T1-weighted and T2-weighted hyperintense hemorrhagic cysts measur
ing 2.0 cm and 1.7 cm and a separate subcentimeter unilocular simple cyst cyst.



No pelvic free fluid. No adenopathy evident. Bladder and rectum are unremarkable.



IMPRESSION:

1. Abnormal expansion of the uterine endometrium with a maximum thickness of 1.6 cm. Given the absenc
e of postcontrast imaging, differentiation between complex fluid and proliferative endometrium is weaver
ited. However, given the absence of T1 weighted hyperintensity, these imaging features are more suspi
cious for abnormal proliferative expansion of the endometrium which in this postmenopausal female cou
ld indicate endometrial hyperplasia or carcinoma. No uterine myometrial invasion evident. See above.

2. Right ovarian mature cystic teratoma measuring 9.0 x 7.8 x 4.3 cm.

3. Left ovary replaced by cystic change with a dominant 4.7 cm cyst with 2 thin internal septations.



Electronically signed by: Slick Briceno MD (2/4/2021 4:39 AM) [facility]

## 2021-02-16 ENCOUNTER — HOSPITAL ENCOUNTER (OUTPATIENT)
Dept: HOSPITAL 61 - SURG | Age: 61
Setting detail: OBSERVATION
LOS: 2 days | Discharge: HOME | End: 2021-02-18
Attending: SURGERY | Admitting: SURGERY
Payer: COMMERCIAL

## 2021-02-16 VITALS — WEIGHT: 240.52 LBS | HEIGHT: 63 IN | BODY MASS INDEX: 42.62 KG/M2

## 2021-02-16 VITALS — DIASTOLIC BLOOD PRESSURE: 88 MMHG | SYSTOLIC BLOOD PRESSURE: 127 MMHG

## 2021-02-16 VITALS — DIASTOLIC BLOOD PRESSURE: 82 MMHG | SYSTOLIC BLOOD PRESSURE: 146 MMHG

## 2021-02-16 VITALS — DIASTOLIC BLOOD PRESSURE: 82 MMHG | SYSTOLIC BLOOD PRESSURE: 163 MMHG

## 2021-02-16 VITALS — SYSTOLIC BLOOD PRESSURE: 178 MMHG | DIASTOLIC BLOOD PRESSURE: 82 MMHG

## 2021-02-16 VITALS — DIASTOLIC BLOOD PRESSURE: 78 MMHG | SYSTOLIC BLOOD PRESSURE: 156 MMHG

## 2021-02-16 VITALS — DIASTOLIC BLOOD PRESSURE: 84 MMHG | SYSTOLIC BLOOD PRESSURE: 157 MMHG

## 2021-02-16 VITALS — SYSTOLIC BLOOD PRESSURE: 170 MMHG | DIASTOLIC BLOOD PRESSURE: 77 MMHG

## 2021-02-16 VITALS — SYSTOLIC BLOOD PRESSURE: 158 MMHG | DIASTOLIC BLOOD PRESSURE: 76 MMHG

## 2021-02-16 VITALS — DIASTOLIC BLOOD PRESSURE: 74 MMHG | SYSTOLIC BLOOD PRESSURE: 130 MMHG

## 2021-02-16 DIAGNOSIS — E78.5: ICD-10-CM

## 2021-02-16 DIAGNOSIS — Z79.82: ICD-10-CM

## 2021-02-16 DIAGNOSIS — N18.6: ICD-10-CM

## 2021-02-16 DIAGNOSIS — E21.3: ICD-10-CM

## 2021-02-16 DIAGNOSIS — D64.9: ICD-10-CM

## 2021-02-16 DIAGNOSIS — Z99.2: ICD-10-CM

## 2021-02-16 DIAGNOSIS — I12.0: ICD-10-CM

## 2021-02-16 DIAGNOSIS — F17.210: ICD-10-CM

## 2021-02-16 DIAGNOSIS — Z20.828: ICD-10-CM

## 2021-02-16 DIAGNOSIS — C50.911: Primary | ICD-10-CM

## 2021-02-16 LAB
ANION GAP SERPL CALC-SCNC: 9 MMOL/L (ref 6–14)
BUN SERPL-MCNC: 15 MG/DL (ref 7–20)
CALCIUM SERPL-MCNC: 10.1 MG/DL (ref 8.5–10.1)
CHLORIDE SERPL-SCNC: 100 MMOL/L (ref 98–107)
CO2 SERPL-SCNC: 31 MMOL/L (ref 21–32)
CREAT SERPL-MCNC: 5.8 MG/DL (ref 0.6–1)
GFR SERPLBLD BASED ON 1.73 SQ M-ARVRAT: 9 ML/MIN
GLUCOSE SERPL-MCNC: 90 MG/DL (ref 70–99)
POTASSIUM SERPL-SCNC: 3.7 MMOL/L (ref 3.5–5.1)
SODIUM SERPL-SCNC: 140 MMOL/L (ref 136–145)

## 2021-02-16 PROCEDURE — 36415 COLL VENOUS BLD VENIPUNCTURE: CPT

## 2021-02-16 PROCEDURE — 88307 TISSUE EXAM BY PATHOLOGIST: CPT

## 2021-02-16 PROCEDURE — G0379 DIRECT REFER HOSPITAL OBSERV: HCPCS

## 2021-02-16 PROCEDURE — G0378 HOSPITAL OBSERVATION PER HR: HCPCS

## 2021-02-16 PROCEDURE — 80048 BASIC METABOLIC PNL TOTAL CA: CPT

## 2021-02-16 PROCEDURE — 38792 RA TRACER ID OF SENTINL NODE: CPT

## 2021-02-16 PROCEDURE — 19303 MAST SIMPLE COMPLETE: CPT

## 2021-02-16 PROCEDURE — 87426 SARSCOV CORONAVIRUS AG IA: CPT

## 2021-02-16 PROCEDURE — 38525 BIOPSY/REMOVAL LYMPH NODES: CPT

## 2021-02-16 PROCEDURE — 99406 BEHAV CHNG SMOKING 3-10 MIN: CPT

## 2021-02-16 PROCEDURE — 88331 PATH CONSLTJ SURG 1 BLK 1SPC: CPT

## 2021-02-16 PROCEDURE — U0003 INFECTIOUS AGENT DETECTION BY NUCLEIC ACID (DNA OR RNA); SEVERE ACUTE RESPIRATORY SYNDROME CORONAVIRUS 2 (SARS-COV-2) (CORONAVIRUS DISEASE [COVID-19]), AMPLIFIED PROBE TECHNIQUE, MAKING USE OF HIGH THROUGHPUT TECHNOLOGIES AS DESCRIBED BY CMS-2020-01-R: HCPCS

## 2021-02-16 PROCEDURE — 96374 THER/PROPH/DIAG INJ IV PUSH: CPT

## 2021-02-16 PROCEDURE — A9520 TC99 TILMANOCEPT DIAG 0.5MCI: HCPCS

## 2021-02-16 PROCEDURE — 96376 TX/PRO/DX INJ SAME DRUG ADON: CPT

## 2021-02-16 PROCEDURE — 88342 IMHCHEM/IMCYTCHM 1ST ANTB: CPT

## 2021-02-16 RX ADMIN — ONDANSETRON PRN MG: 2 INJECTION INTRAMUSCULAR; INTRAVENOUS at 20:44

## 2021-02-16 RX ADMIN — DOXAZOSIN SCH MG: 4 TABLET ORAL at 21:02

## 2021-02-16 RX ADMIN — ATORVASTATIN CALCIUM SCH MG: 40 TABLET, FILM COATED ORAL at 21:00

## 2021-02-16 RX ADMIN — CARVEDILOL SCH MG: 12.5 TABLET, FILM COATED ORAL at 15:31

## 2021-02-16 RX ADMIN — HYDROCODONE BITARTRATE AND ACETAMINOPHEN PRN TAB: 5; 325 TABLET ORAL at 16:49

## 2021-02-16 RX ADMIN — HYDROCODONE BITARTRATE AND ACETAMINOPHEN PRN TAB: 5; 325 TABLET ORAL at 15:32

## 2021-02-16 RX ADMIN — BACITRACIN SCH MLS/HR: 5000 INJECTION, POWDER, FOR SOLUTION INTRAMUSCULAR at 13:00

## 2021-02-16 NOTE — NUR
Have received patient to room 512.  Patient alert and oriented. Sister at bedside. IVF 
infusing tko at this time, approx 100ml left in bag.  Dressing right chest dry and intact.  
STEPHANE drain in place, was emptied just prior to transfer. Dialysis dept, and also Dr Lua were 
informed of consult. Patient on dialysis on Mon-Wed-Fri.  Patient medicated for pain with 
one Lortab after eating some applesauce.Pt has been set up for frequent vital signs.  Call 
light within easy reach.  Have requested new phone for room.

## 2021-02-16 NOTE — RAD
Radiopharmaceutical injection right breast for sentinel node mapping:



Reason for examination: Right breast cancer.  Radiopharmaceutical injection requested for intraoperat
bree sentinel node biopsy guidance. 



Procedure and Findings:



The patient was informed and consented for radiopharmaceutical injection of the right breast. Using s
terile technique, 1000 uCi of Tc 99m filtered tilmanocept was administered transdermally in the peria
reolar region at approximately 9:25 AM on 2/16/2021. The patient tolerated the procedure well and the
re were no apparent complications. The patient was returned to surgery in stable condition.



Impression:



Successful radiopharmaceutical injection of the right breast for intraoperative sentinel node mapping
.



Electronically signed by: Geraldine Sewell MD (2/16/2021 2:18 PM) [id number]

## 2021-02-16 NOTE — PDOC4
Operative Note


Operative Note


Operative Note:





Preoperative Diagnosis: Right breast cancer





Postoperative Diagnosis: Same





Procedure: Right simple mastectomy, sentinel lymph node biopsy





Surgeon: Charbel





Assistant:  Carl ALLEN





Anesthesia: General





EBL: 50 mL





Specimen: Right breast stitch 12:00 to pathology, sentinel lymph nodes 1, 2 to 

pathology





Drains: 19 Lebanese STEPHANE drain to right chest wall





Complications: None





Indication: The patient is a 60-year-old female who is referred with right 

breast cancer.  The plan is for a right simple mastectomy with sentinel lymph 

node biopsy.  The risks of surgery were discussed which include bleeding, 

infection, wound healing problems, pain, anesthetic risk, potential need for 

additional surgery procedure.  She understands and would like to proceed.





Description: The patient was initially taken to x-ray where she underwent 

injection of technetium sulfur colloid.  She was then taken to the operating 

room and laid supine on the operating table.  General anesthesia was performed. 

The right breast and axilla were prepped with ChloraPrep and draped in a 

standard surgical manner.  Five mL of Lymphazurin were injected deep to the 

nipple areolar complex.  Several minutes were allowed to elapse.  An elliptical 

tracing was made around the right breast extending from the medial chest to the 

axilla.  The superior lateral aspect of the tracing was opened with a scalpel.  

Cautery dissection was carried out into the axillary tissues.  There were two 

separate areas of increased nuclear uptake corresponding to sentinel lymph 

nodes.  These were both harvested and sent to pathology.  There was no palpable 

lymphadenopathy.  No other areas of increased nuclear uptake or blue staining 

were identified.  Frozen section evaluation of both lymph nodes was negative for

metastasis.  We proceeded with the mastectomy.  With a scalpel an incision was 

made along the remainder of the elliptical tracing.  Cautery dissection was used

to develop the skin flaps.  The superior flap was developed first  the

skin from the deeper breast parenchyma.  This was carried superiorly to the 

level just below the clavicle.  In a similar manner the inferior flap was 

developed which included the inframammary fold.  In the medial to lateral 

fashion the breast was freed off of the chest wall.  Several small blood vessels

were readily controlled with cautery.  The entire breast was then fully excised 

and a stitch was used to shawnee the 12 o'clock position.  The specimen was sent to

pathology.  A 19 Lebanese round Morgan drain was left in the chest wall which 

exited inferiorly.  This was secured to the skin with 2-0 silk.  The 

subcutaneous tissues were approximated with 3-0 Vicryl.  The skin was closed 

with 4-0 Monocryl.  A sterile OpSite dressing was then applied.  The patient 

tolerated the procedure well and was sent to the recovery room in [facility].  At the end of the case all counts were correct.











DAWIT PETER MD             Feb 16, 2021 12:58

## 2021-02-17 VITALS — DIASTOLIC BLOOD PRESSURE: 84 MMHG | SYSTOLIC BLOOD PRESSURE: 138 MMHG

## 2021-02-17 VITALS — DIASTOLIC BLOOD PRESSURE: 61 MMHG | SYSTOLIC BLOOD PRESSURE: 117 MMHG

## 2021-02-17 VITALS — SYSTOLIC BLOOD PRESSURE: 123 MMHG | DIASTOLIC BLOOD PRESSURE: 56 MMHG

## 2021-02-17 VITALS — DIASTOLIC BLOOD PRESSURE: 59 MMHG | SYSTOLIC BLOOD PRESSURE: 98 MMHG

## 2021-02-17 VITALS — SYSTOLIC BLOOD PRESSURE: 108 MMHG | DIASTOLIC BLOOD PRESSURE: 79 MMHG

## 2021-02-17 VITALS — DIASTOLIC BLOOD PRESSURE: 79 MMHG | SYSTOLIC BLOOD PRESSURE: 71 MMHG

## 2021-02-17 RX ADMIN — HYDROCODONE BITARTRATE AND ACETAMINOPHEN PRN TAB: 5; 325 TABLET ORAL at 04:27

## 2021-02-17 RX ADMIN — DOXAZOSIN SCH MG: 4 TABLET ORAL at 23:12

## 2021-02-17 RX ADMIN — Medication SCH TAB: at 10:00

## 2021-02-17 RX ADMIN — CINACALCET HYDROCHLORIDE SCH MG: 30 TABLET, FILM COATED ORAL at 10:04

## 2021-02-17 RX ADMIN — CLOPIDOGREL BISULFATE SCH MG: 75 TABLET ORAL at 10:11

## 2021-02-17 RX ADMIN — ASPIRIN 81 MG SCH MG: 81 TABLET ORAL at 10:05

## 2021-02-17 RX ADMIN — CARVEDILOL SCH MG: 12.5 TABLET, FILM COATED ORAL at 10:01

## 2021-02-17 RX ADMIN — ATORVASTATIN CALCIUM SCH MG: 40 TABLET, FILM COATED ORAL at 21:00

## 2021-02-17 RX ADMIN — ONDANSETRON PRN MG: 2 INJECTION INTRAMUSCULAR; INTRAVENOUS at 04:26

## 2021-02-17 RX ADMIN — CINACALCET HYDROCHLORIDE SCH MG: 30 TABLET, FILM COATED ORAL at 09:00

## 2021-02-17 RX ADMIN — CARVEDILOL SCH MG: 12.5 TABLET, FILM COATED ORAL at 17:00

## 2021-02-17 RX ADMIN — BACITRACIN SCH MLS/HR: 5000 INJECTION, POWDER, FOR SOLUTION INTRAMUSCULAR at 13:00

## 2021-02-17 NOTE — PDOC
PROGRESS NOTES


Date of Service


DATE: 2/17/21 


TIME: 09:01





Subjective


Subjective


doing well





Objective


Objective





Vital Signs








  Date Time  Temp Pulse Resp B/P (MAP) Pulse Ox O2 Delivery O2 Flow Rate FiO2


 


2/17/21 05:30     93 Room Air  


 


2/17/21 03:00 98.4 87 20 108/79 (89)    





 98.4       














Intake and Output 


 


 2/17/21





 07:00


 


Intake Total 600 ml


 


Output Total 75 ml


 


Balance 525 ml


 


 


 


Intake Oral 600 ml


 


Output Emesis 75 ml


 


# Voids 1











Physical Exam


Physical Exam


dressing clean and dry





Assessment


Assessment


POD 1 mastectomy





Plan


Plan of Care


Dialysis today, then discharge





Comment


Review of Relevant


I have reviewed the following items [place] (where applicable) has been applied.


Labs





Laboratory Tests








Test


 2/16/21


08:30


 


Sodium Level


 140 mmol/L


(136-145)


 


Potassium Level


 3.7 mmol/L


(3.5-5.1)


 


Chloride Level


 100 mmol/L


()


 


Carbon Dioxide Level


 31 mmol/L


(21-32)


 


Anion Gap 9 (6-14) 


 


Blood Urea Nitrogen


 15 mg/dL


(7-20)


 


Creatinine


 5.8 mg/dL


(0.6-1.0)


 


Estimated GFR


(Cockcroft-Gault) 9.0 





 


Glucose Level


 90 mg/dL


(70-99)


 


Calcium Level


 10.1 mg/dL


(8.5-10.1)


 


SARS-CoV-2 Antigen (Rapid)


 Negative


(NEGATIVE)








Medications





Current Medications


Fentanyl Citrate (Fentanyl 2ml Vial) 25 mcg PRN Q5MIN  PRN IVP MILD PAIN 1-3;  

Start 2/16/21 at 06:00;  Stop 2/16/21 at 20:00;  Status DC


Fentanyl Citrate (Fentanyl 2ml Vial) 50 mcg PRN Q5MIN  PRN IVP MODERATE PAIN 4-

6;  Start 2/16/21 at 06:00;  Stop 2/16/21 at 20:00;  Status DC


Morphine Sulfate (Morphine Sulfate) 1 mg PRN Q10MIN  PRN IVP SEVERE PAIN 7-10;  

Start 2/16/21 at 06:00;  Stop 2/16/21 at 20:00;  Status DC


Ringer's Solution 1,000 ml @  30 mls/hr Q24H IV ;  Start 2/16/21 at 06:00;  Stop

2/16/21 at 17:59;  Status DC


Hydromorphone HCl (Dilaudid) 0.5 mg PRN Q10MIN  PRN IVP SEVERE PAIN 7-10, 2nd 

CHOICE;  Start 2/16/21 at 06:00;  Stop 2/16/21 at 20:00;  Status DC


Prochlorperazine Edisylate (Compazine) 5 mg PACU PRN  PRN IVP NAUSEA, MRX1;  

Start 2/16/21 at 06:00;  Stop 2/16/21 at 20:00;  Status DC


Levofloxacin/ Dextrose 100 ml @  100 mls/hr 1X PREOP  PRN IV PRIOR TO PROCEDURE;

 Start 2/16/21 at 06:00;  Stop 2/16/21 at 18:00;  Status DC


Isosulfan Blue (Lymphazurin Blue) 50 mg STK-MED ONCE SQ  Last administered on 

2/16/21at 11:04;  Start 2/16/21 at 07:10;  Stop 2/16/21 at 07:11;  Status DC


Propofol (Diprivan) 200 mg STK-MED ONCE IV ;  Start 2/16/21 at 11:21;  Stop 

2/16/21 at 11:21;  Status DC


Lidocaine HCl (Lidocaine Pf 2% Vial) 5 ml STK-MED ONCE .ROUTE ;  Start 2/16/21 

at 11:21;  Stop 2/16/21 at 11:22;  Status DC


Ondansetron HCl (Zofran) 4 mg STK-MED ONCE .ROUTE ;  Start 2/16/21 at 11:21;  

Stop 2/16/21 at 11:22;  Status DC


Phenylephrine HCl (PHENYLEPHRINE in 0.9% NACL PF) 1 mg STK-MED ONCE IV ;  Start 

2/16/21 at 11:21;  Stop 2/16/21 at 11:22;  Status DC


Dexamethasone Sodium Phosphate (Decadron) 4 mg STK-MED ONCE .ROUTE ;  Start 2/16 /21 at 11:21;  Stop 2/16/21 at 11:22;  Status DC


Phenylephrine HCl (Eren-Synephrine Inj) 10 mg STK-MED ONCE .ROUTE ;  Start 2/16/ 21 at 11:21;  Stop 2/16/21 at 11:22;  Status DC


Sodium Chloride (Normal Saline Flush) 3 ml QSHIFT  PRN IV AFTER MEDS AND BLOOD D

RAWS;  Start 2/16/21 at 13:00


Acetaminophen/ Hydrocodone Bitart (Lortab 5/325) 1 tab PRN Q4HRS  PRN PO MILD 

PAIN 1-3 Last administered on 2/17/21at 04:27;  Start 2/16/21 at 13:00


Acetaminophen/ Hydrocodone Bitart (Lortab 5/325) 2 tab PRN Q4HRS  PRN PO MODE

RATE PAIN, SEVERE PAIN;  Start 2/16/21 at 13:00


Naloxone HCl (Narcan) 0.4 mg PRN Q2MIN  PRN IV SEE INSTRUCTIONS;  Start 2/16/21 

at 13:00


Sodium Chloride 1,000 ml @  25 mls/hr Q24H IV ;  Start 2/16/21 at 13:00


Hydromorphone HCl (Dilaudid) 0.2 mg PRN Q1HR  PRN IV PAIN;  Start 2/16/21 at 

13:00


Ondansetron HCl (Zofran) 4 mg PRN Q6HRS  PRN IVP NASAJAN, 1ST CHOICE Last 

administered on 2/17/21at 04:26;  Start 2/16/21 at 13:00


Amlodipine Besylate (Norvasc) 10 mg DAILY PO ;  Start 2/17/21 at 09:00


Aspirin (Aspirin Chewable) 81 mg DAILY PO ;  Start 2/17/21 at 09:00


Atorvastatin Calcium (Lipitor) 40 mg HS PO ;  Start 2/16/21 at 21:00


Cinacalcet (Sensipar) 90 mg DAILY PO ;  Start 2/17/21 at 09:00


Clopidogrel Bisulfate (Plavix) 75 mg DAILY PO ;  Start 2/17/21 at 09:00


Vitamin B Complex/ Vitamin C (Dayami-Zoya) 1 tab DAILY PO ;  Start 2/17/21 at 

09:00


Hydralazine HCl (Apresoline) 50 mg TID PO  Last administered on 2/16/21at 21:02;

 Start 2/16/21 at 14:00


Lorazepam (Ativan) 0.5 mg TID PO  Last administered on 2/16/21at 21:04;  Start 

2/16/21 at 14:00


Carvedilol (Coreg) 25 mg BIDWMEALS PO  Last administered on 2/16/21at 15:31;  

Start 2/16/21 at 17:00


Doxazosin Mesylate (Cardura) 8 mg QHS PO  Last administered on 2/16/21at 21:02; 

Start 2/16/21 at 21:00


Sevoflurane (Ultane) 90 ml STK-MED ONCE IH ;  Start 2/16/21 at 13:16;  Stop 

2/16/21 at 13:16;  Status DC


Fentanyl Citrate (Fentanyl 2ml Vial) 100 mcg STK-MED ONCE .ROUTE ;  Start 

2/16/21 at 13:21;  Stop 2/16/21 at 13:21;  Status DC


Hydralazine HCl (Apresoline Inj) 20 mg STK-MED ONCE .ROUTE ;  Start 2/16/21 at 

13:46;  Stop 2/16/21 at 13:46;  Status DC


Hydromorphone HCl (Dilaudid) 2 mg STK-MED ONCE .ROUTE ;  Start 2/16/21 at 13:46;

 Stop 2/16/21 at 13:46;  Status DC


Hydralazine HCl (Apresoline Inj) 10 mg ONCE  ONCE IVP ;  Start 2/16/21 at 16:00;

 Stop 2/17/21 at 07:18;  Status DC


Fentanyl Citrate (Fentanyl 2ml Vial) 25 mcg PRN Q5MIN  PRN IVP MILD PAIN 1-3;  

Start 2/16/21 at 16:00;  Stop 2/18/21 at 15:59


Fentanyl Citrate (Fentanyl 2ml Vial) 50 mcg PRN Q5MIN  PRN IVP MODERATE PAIN 4-

6;  Start 2/16/21 at 16:00;  Stop 2/18/21 at 15:59


Morphine Sulfate (Morphine Sulfate) 1 mg PRN Q10MIN  PRN IVP SEVERE PAIN 7-10;  

Start 2/16/21 at 16:00;  Stop 2/18/21 at 15:59


Ringer's Solution 1,000 ml @  30 mls/hr Q24H IV ;  Start 2/16/21 at 16:00;  Stop

2/17/21 at 15:59


Hydromorphone HCl (Dilaudid) 0.5 mg PRN Q10MIN  PRN IVP SEVERE PAIN 7-10, 2nd 

CHOICE;  Start 2/16/21 at 16:00;  Stop 2/18/21 at 15:59


Prochlorperazine Edisylate (Compazine) 5 mg PACU PRN  PRN IVP NAUSEA, MRX1;  

Start 2/16/21 at 16:00;  Stop 2/18/21 at 15:59





Active Scripts


Active


Reported


Clopidogrel (Clopidogrel Bisulfate) 75 Mg Tablet 1 Tab PO DAILY


Nephro-Zoya Tablet (Folic Acid/Vitamin B Comp W-C) 0.8 Mg Tablet 1 Tab PO DAILY


Ativan (Lorazepam) 0.5 Mg Tablet 0.5 Mg PO TID


Aspirin 81 Mg Tab.chew 1 Tab PO DAILY


Atorvastatin Calcium 40 Mg Tablet 40 Mg PO HS


Coreg (Carvedilol) 25 Mg Tablet 1 Tab PO BID


Hydralazine Hcl 50 Mg Tablet 1 Tab PO TID


Amlodipine Besylate 10 Mg Tablet 1 Tab PO DAILY


Doxazosin Mesylate 2 Mg Tablet 8 Mg PO QHS


Sensipar (Cinacalcet Hcl) 30 Mg Tablet 90 Mg PO DAILY


Vitals/I & O





Vital Sign - Last 24 Hours








 2/16/21 2/16/21 2/16/21 2/16/21





 15:00 15:20 15:31 15:32


 


Temp 97.3   





 97.3   


 


Pulse 91 89 87 


 


Resp 16   20


 


B/P (MAP) 158/76 (103) 146/82 (103) 146/82 


 


Pulse Ox 98 99  95


 


O2 Delivery Nasal Cannula Nasal Cannula  Room Air


 


    





    





 2/16/21 2/16/21 2/16/21 2/16/21





 15:35 15:50 16:00 16:20


 


Pulse 86 85  88


 


B/P (MAP) 163/82 (109) 170/77 (108)  156/78 (104)


 


Pulse Ox 97 99  96


 


O2 Delivery Nasal Cannula Nasal Cannula Room Air Nasal Cannula





 2/16/21 2/16/21 2/16/21 2/16/21





 16:32 16:49 16:50 17:49


 


Pulse   90 


 


Resp 20 18  20


 


B/P (MAP)   178/82 (114) 


 


Pulse Ox 100 95 97 100


 


O2 Delivery Room Air Room Air Nasal Cannula Room Air





 2/16/21 2/16/21 2/16/21 2/16/21





 17:50 19:00 19:40 21:02


 


Temp  97.6  





  97.6  


 


Pulse 103 88  88


 


Resp  20  


 


B/P (MAP) 157/84 (108) 127/88 (101)  127/88


 


Pulse Ox 100 94  


 


O2 Delivery Nasal Cannula Room Air Room Air 


 


    





    





 2/16/21 2/16/21 2/17/21 2/17/21





 21:02 23:00 03:00 04:27


 


Temp  97.5 98.4 





  97.5 98.4 


 


Pulse 88 87 87 


 


Resp  20 20 


 


B/P (MAP) 127/88 130/74 (92) 108/79 (89) 


 


Pulse Ox  93 98 93


 


O2 Delivery  Room Air Room Air Room Air


 


    





    





 2/17/21   





 05:30   


 


Pulse Ox 93   


 


O2 Delivery Room Air   














Intake and Output   


 


 2/16/21 2/16/21 2/17/21





 15:00 23:00 07:00


 


Intake Total  200 ml 400 ml


 


Output Total  75 ml 


 


Balance  125 ml 400 ml











Justifications for Admission


Other Justification














DAWIT PETER MD             Feb 17, 2021 09:02

## 2021-02-17 NOTE — DISCH
DISCHARGE INSTRUCTIONS


Condition on Discharge


Condition on Discharge:  Stable





Activity After Discharge


Activity Instructions for Disc:  Activity as tolerated, Other, see below





Diet after Discharge


Diet after Discharge:  Renal Dialysis





Wound Incision Care


Wound/Incision Care:  Other, see below (keep dressing clean and dry, sponge 

bathe around the dressing and drain;  record STEPHANE drain output twice daily, bring 

record to follow up appointment)





Contacting the  after DC


Call your doctor for:  Concerns you may have





Follow-Up


Follow up with:  Dr Peter in office in 1 week, call for appointment 

[phone]











DAWIT PETER MD             Feb 17, 2021 09:09

## 2021-02-17 NOTE — NUR
SW following. Discussed with RN, pt from home, room air, regular diet. Pt discharging with 
STEPHANE drain. Pt had surgery on 2/16/21. Discharge order for home with self care after pt is 
done with dialysis. RN advised no SW needs. SW will continue to follow.

## 2021-02-17 NOTE — PDOC2
CONSULT


Date of Consult


Date of Consult


DATE: 2/17/21 


TIME: 13:48





Reason for Consult


Reason for Consult:


ESRD





Source


Source:  Chart review, Patient





History of Present Illness


Reason for Visit:


 Pt is a 59 yo AAF with Dx of ESRD on HD MWF, Right breast cancer s/p  Right 

simple mastectomy 7/16  .  Renal consulted as today is her routine dialysis day.

Denies any CP, SOB, No f/c. No N/V/D.





Past Medical History


Cardiovascular:  HTN, Hyperlipidemia


Pulmonary:  Other


GI:  No pertinent hx


Heme/Onc:  Anemia NOS


Hepatobiliary:  No pertinent hx


Psych:  No pertinent hx


Infectious disease:  No pertinent hx


Endocrine:  Hyperparathyroidism





Past Surgical History


Past Surgical History:  No pertinent history





Family History


Family History:  Hypertension





Social History


ALCOHOL:  none


Drugs:  None





Current Medications


Current Medications





Current Medications


Fentanyl Citrate (Fentanyl 2ml Vial) 25 mcg PRN Q5MIN  PRN IVP MILD PAIN 1-3;  

Start 2/16/21 at 06:00;  Stop 2/16/21 at 20:00;  Status DC


Fentanyl Citrate (Fentanyl 2ml Vial) 50 mcg PRN Q5MIN  PRN IVP MODERATE PAIN 4-

6;  Start 2/16/21 at 06:00;  Stop 2/16/21 at 20:00;  Status DC


Morphine Sulfate (Morphine Sulfate) 1 mg PRN Q10MIN  PRN IVP SEVERE PAIN 7-10;  

Start 2/16/21 at 06:00;  Stop 2/16/21 at 20:00;  Status DC


Ringer's Solution 1,000 ml @  30 mls/hr Q24H IV ;  Start 2/16/21 at 06:00;  Stop

2/16/21 at 17:59;  Status DC


Hydromorphone HCl (Dilaudid) 0.5 mg PRN Q10MIN  PRN IVP SEVERE PAIN 7-10, 2nd 

CHOICE;  Start 2/16/21 at 06:00;  Stop 2/16/21 at 20:00;  Status DC


Prochlorperazine Edisylate (Compazine) 5 mg PACU PRN  PRN IVP NAUSEA, MRX1;  

Start 2/16/21 at 06:00;  Stop 2/16/21 at 20:00;  Status DC


Levofloxacin/ Dextrose 100 ml @  100 mls/hr 1X PREOP  PRN IV PRIOR TO PROCEDURE;

 Start 2/16/21 at 06:00;  Stop 2/16/21 at 18:00;  Status DC


Isosulfan Blue (Lymphazurin Blue) 50 mg STK-MED ONCE SQ  Last administered on 

2/16/21at 11:04;  Start 2/16/21 at 07:10;  Stop 2/16/21 at 07:11;  Status DC


Propofol (Diprivan) 200 mg STK-MED ONCE IV ;  Start 2/16/21 at 11:21;  Stop 

2/16/21 at 11:21;  Status DC


Lidocaine HCl (Lidocaine Pf 2% Vial) 5 ml STK-MED ONCE .ROUTE ;  Start 2/16/21 

at 11:21;  Stop 2/16/21 at 11:22;  Status DC


Ondansetron HCl (Zofran) 4 mg STK-MED ONCE .ROUTE ;  Start 2/16/21 at 11:21;  

Stop 2/16/21 at 11:22;  Status DC


Phenylephrine HCl (PHENYLEPHRINE in 0.9% NACL PF) 1 mg STK-MED ONCE IV ;  Start 

2/16/21 at 11:21;  Stop 2/16/21 at 11:22;  Status DC


Dexamethasone Sodium Phosphate (Decadron) 4 mg STK-MED ONCE .ROUTE ;  Start 

2/16/21 at 11:21;  Stop 2/16/21 at 11:22;  Status DC


Phenylephrine HCl (Eren-Synephrine Inj) 10 mg STK-MED ONCE .ROUTE ;  Start 

2/16/21 at 11:21;  Stop 2/16/21 at 11:22;  Status DC


Sodium Chloride (Normal Saline Flush) 3 ml QSHIFT  PRN IV AFTER MEDS AND BLOOD 

DRAWS;  Start 2/16/21 at 13:00


Acetaminophen/ Hydrocodone Bitart (Lortab 5/325) 1 tab PRN Q4HRS  PRN PO MILD 

PAIN 1-3 Last administered on 2/17/21at 04:27;  Start 2/16/21 at 13:00


Acetaminophen/ Hydrocodone Bitart (Lortab 5/325) 2 tab PRN Q4HRS  PRN PO 

MODERATE PAIN, SEVERE PAIN;  Start 2/16/21 at 13:00


Naloxone HCl (Narcan) 0.4 mg PRN Q2MIN  PRN IV SEE INSTRUCTIONS;  Start 2/16/21 

at 13:00


Sodium Chloride 1,000 ml @  25 mls/hr Q24H IV ;  Start 2/16/21 at 13:00


Hydromorphone HCl (Dilaudid) 0.2 mg PRN Q1HR  PRN IV PAIN;  Start 2/16/21 at 

13:00


Ondansetron HCl (Zofran) 4 mg PRN Q6HRS  PRN IVP NAUESA, 1ST CHOICE Last 

administered on 2/17/21at 04:26;  Start 2/16/21 at 13:00


Amlodipine Besylate (Norvasc) 10 mg DAILY PO  Last administered on 2/17/21at 

10:02;  Start 2/17/21 at 09:00


Aspirin (Aspirin Chewable) 81 mg DAILY PO  Last administered on 2/17/21at 10:05;

 Start 2/17/21 at 09:00


Atorvastatin Calcium (Lipitor) 40 mg HS PO ;  Start 2/16/21 at 21:00


Cinacalcet (Sensipar) 90 mg DAILY PO ;  Start 2/17/21 at 09:00


Clopidogrel Bisulfate (Plavix) 75 mg DAILY PO  Last administered on 2/17/21at 

10:11;  Start 2/17/21 at 09:00


Vitamin B Complex/ Vitamin C (Dayami-Zoya) 1 tab DAILY PO  Last administered on 

2/17/21at 10:00;  Start 2/17/21 at 09:00


Hydralazine HCl (Apresoline) 50 mg TID PO  Last administered on 2/17/21at 10:03;

 Start 2/16/21 at 14:00


Lorazepam (Ativan) 0.5 mg TID PO  Last administered on 2/17/21at 10:05;  Start 

2/16/21 at 14:00


Carvedilol (Coreg) 25 mg BIDWMEALS PO  Last administered on 2/17/21at 10:01;  

Start 2/16/21 at 17:00


Doxazosin Mesylate (Cardura) 8 mg QHS PO  Last administered on 2/16/21at 21:02; 

Start 2/16/21 at 21:00


Sevoflurane (Ultane) 90 ml STK-MED ONCE IH ;  Start 2/16/21 at 13:16;  Stop 

2/16/21 at 13:16;  Status DC


Fentanyl Citrate (Fentanyl 2ml Vial) 100 mcg STK-MED ONCE .ROUTE ;  Start 

2/16/21 at 13:21;  Stop 2/16/21 at 13:21;  Status DC


Hydralazine HCl (Apresoline Inj) 20 mg STK-MED ONCE .ROUTE ;  Start 2/16/21 at 

13:46;  Stop 2/16/21 at 13:46;  Status DC


Hydromorphone HCl (Dilaudid) 2 mg STK-MED ONCE .ROUTE ;  Start 2/16/21 at 13:46;

 Stop 2/16/21 at 13:46;  Status DC


Hydralazine HCl (Apresoline Inj) 10 mg ONCE  ONCE IVP ;  Start 2/16/21 at 16:00;

 Stop 2/17/21 at 07:18;  Status DC


Fentanyl Citrate (Fentanyl 2ml Vial) 25 mcg PRN Q5MIN  PRN IVP MILD PAIN 1-3;  

Start 2/16/21 at 16:00;  Stop 2/18/21 at 15:59


Fentanyl Citrate (Fentanyl 2ml Vial) 50 mcg PRN Q5MIN  PRN IVP MODERATE PAIN 4-

6;  Start 2/16/21 at 16:00;  Stop 2/18/21 at 15:59


Morphine Sulfate (Morphine Sulfate) 1 mg PRN Q10MIN  PRN IVP SEVERE PAIN 7-10;  

Start 2/16/21 at 16:00;  Stop 2/18/21 at 15:59


Ringer's Solution 1,000 ml @  30 mls/hr Q24H IV ;  Start 2/16/21 at 16:00;  Stop

2/17/21 at 15:59


Hydromorphone HCl (Dilaudid) 0.5 mg PRN Q10MIN  PRN IVP SEVERE PAIN 7-10, 2nd 

CHOICE;  Start 2/16/21 at 16:00;  Stop 2/18/21 at 15:59


Prochlorperazine Edisylate (Compazine) 5 mg PACU PRN  PRN IVP NAUSEA, MRX1;  

Start 2/16/21 at 16:00;  Stop 2/18/21 at 15:59





Active Scripts


Active


Reported


Clopidogrel (Clopidogrel Bisulfate) 75 Mg Tablet 1 Tab PO DAILY


Nephro-Zoya Tablet (Folic Acid/Vitamin B Comp W-C) 0.8 Mg Tablet 1 Tab PO DAILY


Ativan (Lorazepam) 0.5 Mg Tablet 0.5 Mg PO TID


Aspirin 81 Mg Tab.chew 1 Tab PO DAILY


Atorvastatin Calcium 40 Mg Tablet 40 Mg PO HS


Coreg (Carvedilol) 25 Mg Tablet 1 Tab PO BID


Hydralazine Hcl 50 Mg Tablet 1 Tab PO TID


Amlodipine Besylate 10 Mg Tablet 1 Tab PO DAILY


Doxazosin Mesylate 2 Mg Tablet 8 Mg PO QHS


Sensipar (Cinacalcet Hcl) 30 Mg Tablet 90 Mg PO DAILY





Allergies


Allergies:  


Coded Allergies:  


     latex (Verified  Allergy, Severe, Rash, 5/24/18)


     Penicillins (Verified  Allergy, Intermediate, Itching, 2/17/21)


     adhesive tape (Verified  Allergy, Intermediate, Itching, 2/17/21)


     povidone-iodine (Verified  Allergy, Intermediate, Itching, 2/17/21)


     soap (Verified  Allergy, Intermediate, Itching, 2/17/21)


     iodine (Verified  Adverse Reaction, Intermediate, 5/24/18)





ROS


Review of System


   As per HPI, rest of the ROS is negative





Physical Exam


Physical Exam


GEN:   NAD 


HEEN:    OM moist  


NECK:supple  


CVS:  RRR 


RESP: CTA, Non labored 


GI:      NT, Obese  


:     No ] CVA tenderness, No andres


NEURO- Grossly normal


DERM- NO rash


Vital Signs





Vital Signs








  Date Time  Temp Pulse Resp B/P (MAP) Pulse Ox O2 Delivery O2 Flow Rate FiO2


 


2/17/21 11:00 97.9 80 18 138/84 (102) 96 Room Air  





 97.9       








Assessment & Plan


ESRD - On HD MWF


Dialysis today , discussed treatment plan with [facility]  





HTN-Home antihypertensives 





Right breast cancer s/p right simple mastectomy with sentinel lymph node biopsy 

on 2/16





Labs


Labs





Laboratory Tests








Test


 2/16/21


08:30


 


Sodium Level


 140 mmol/L


(136-145)


 


Potassium Level


 3.7 mmol/L


(3.5-5.1)


 


Chloride Level


 100 mmol/L


()


 


Carbon Dioxide Level


 31 mmol/L


(21-32)


 


Anion Gap 9 (6-14) 


 


Blood Urea Nitrogen


 15 mg/dL


(7-20)


 


Creatinine


 5.8 mg/dL


(0.6-1.0)


 


Estimated GFR


(Cockcroft-Gault) 9.0 





 


Glucose Level


 90 mg/dL


(70-99)


 


Calcium Level


 10.1 mg/dL


(8.5-10.1)


 


SARS-CoV-2 Antigen (Rapid)


 Negative


(NEGATIVE)








Review


All relevant outside records, renal labs, imaging studies, telemetry/EKG's were 

reviewed.











PATTI MONTOYA MD                Feb 17, 2021 13:55

## 2021-02-18 VITALS
DIASTOLIC BLOOD PRESSURE: 63 MMHG | SYSTOLIC BLOOD PRESSURE: 104 MMHG | SYSTOLIC BLOOD PRESSURE: 104 MMHG | DIASTOLIC BLOOD PRESSURE: 63 MMHG | SYSTOLIC BLOOD PRESSURE: 104 MMHG | DIASTOLIC BLOOD PRESSURE: 63 MMHG | SYSTOLIC BLOOD PRESSURE: 104 MMHG | DIASTOLIC BLOOD PRESSURE: 63 MMHG | SYSTOLIC BLOOD PRESSURE: 104 MMHG | DIASTOLIC BLOOD PRESSURE: 63 MMHG | SYSTOLIC BLOOD PRESSURE: 104 MMHG | DIASTOLIC BLOOD PRESSURE: 63 MMHG | SYSTOLIC BLOOD PRESSURE: 104 MMHG | DIASTOLIC BLOOD PRESSURE: 63 MMHG

## 2021-02-18 VITALS — DIASTOLIC BLOOD PRESSURE: 63 MMHG | SYSTOLIC BLOOD PRESSURE: 137 MMHG

## 2021-02-18 RX ADMIN — ASPIRIN 81 MG SCH MG: 81 TABLET ORAL at 08:42

## 2021-02-18 RX ADMIN — CINACALCET HYDROCHLORIDE SCH MG: 30 TABLET, FILM COATED ORAL at 09:00

## 2021-02-18 RX ADMIN — CARVEDILOL SCH MG: 12.5 TABLET, FILM COATED ORAL at 08:00

## 2021-02-18 RX ADMIN — CLOPIDOGREL BISULFATE SCH MG: 75 TABLET ORAL at 08:43

## 2021-02-18 RX ADMIN — Medication SCH TAB: at 08:43

## 2021-02-18 NOTE — NUR
patient discharged home with family.  meds and follow up reviewed.  pt provided w/ printed 
instructions on STEPHANE drain care.  RN demonstrated how to empty drain, measure, and instructed 
to do so BID and record the information.  pt v/u.  IV removed, cath intact.  pt stable upon 
dc.

## 2021-02-18 NOTE — NUR
SW following. Discussed with RN, pt did not discharge home yesterday as she returned from 
dialysis late. Pt discharging home today. RN advised no SW needs.

## 2021-02-22 NOTE — PATHOLOGY
[facility] Accession Number: [id number]

.                                                                01

Material submitted:                                        .

PART A: lymph node - SENTINEL LYMPH NODE FROZEN SECTION

PART B: lymph node - SENTINEL LYMPH NODE #2 FROZEN SECTION

PART C: breast - RIGHT BREAST STITCH 12:00. Modifiers: right, 12:00

.                                                                02

Frozen section diagnosis:                                       .

INTRAOPERATIVE CONSULTATION WITH FROZEN SECTION:

FSA1.  Sabattus lymph node #1:

- Negative for tumor.

.

The results are reported to Dr. Barger in the operating room.

.

FSB1.  Sabattus lymph node #2:

- Negative for tumor.

.

The results are reported to Dr. Barger in the operating room.

(JPM:pit 02/16/2021)

.

FROZEN SECTION GROSS DESCRIPTION:

A. The specimen is received fresh for intraoperative consultation and is

designated "sentinel lymph node #1".  This consists of an irregular

segment of yellow fatty tissue measuring up to 4.0 cm in length and 2.5 cm

in width.  Sectioning reveals a yellow and gray slightly lobulated lymph

node measuring up to 3.5 cm in length and showing partial fatty

replacement with a thin rim of brown cortex.  One half of this node is

submitted for frozen section as FSA1.  The tissue remaining from frozen

section is submitted for permanent sections as A1.  The remainder of the

lymph node is submitted for microscopy as A2.

.

B. The specimen is received fresh for intraoperative consultation and is

designated "sentinel lymph node #2".  This consists of a segment of

yellow-red fatty tissue measuring up to 2.5 cm in length and 1.7 cm in

width.  Sectioning reveals a yellow-gray partially fatty replaced lymph

node measuring up to 1.9 cm in greatest dimension.  This is submitted

without sectioning as FSB1.  The tissue remaining from frozen section is

submitted for permanent sections as B1.

(JPM:pit:db 02/16/2021)

.

Frozen sections performed at [facility], [address] [place] [address], [place] [address].

GABRIELA/MBBERONICA

.                                                                02

**********************************************************************

Diagnosis:

A. Sabattus lymph node #1, excision:

  - Negative for tumor (0/1).

.

B. Sabattus lymph node #2, excision:

  - Negative for tumor (0/1).

.

C. Breast, right simple mastectomy:

  - RESIDUAL INVASIVE DUCTAL CARCINOMA, MODERATELY DIFFERENTIATED (GRADE

2), FORMING A TUMOR MASS AT THE 12:00 POSITION MEASURING 2.3 CM IN

GREATEST DIMENSION.

  - DUCTAL CARCINOMA IN SITU, INTERMEDIATE GRADE, PAPILLARY TYPE, FOCAL,

UPPER INNER QUADRANT.

  - INVASIVE CARCINOMA IS APPROXIMATELY 0.9 CM FROM THE CLOSEST

SUPERIOR/ANTERIOR MARGIN OF RESECTION.

  - DEEP MARGIN OF RESECTION NEGATIVE FOR TUMOR.

  - NIPPLE NEGATIVE FOR TUMOR.

  - Previous biopsy site changes.

  - Proliferative fibrocystic changes with the following components:

    - Moderate ductal epithelial hyperplasia, focal.

    - Nodular periductal and confluent stromal fibrosis, focal.

    - Duct ectasia.

.

.

Surgical Pathology Cancer Case Summary

.

INVASIVE CARCINOMA OF THE BREAST: Resection

.

Procedure

___ Simple mastectomy

.

Specimen Laterality

___ Right

.

+ Tumor Site

+ ___ Clock position: 12:00

.

Tumor Size

___ Greatest dimension of largest invasive focus >1 mm:  23 mm

.

Histologic Type

___ Invasive carcinoma of no special type (ductal)

.

Histologic Grade (Nuris Histologic Score)

.

Glandular (Acinar)/Tubular Differentiation

___ Score 3 (<10% of tumor area forming glandular/tubular structures)

.

Nuclear Pleomorphism

___ Score 2 (cells larger than normal with open vesicular nuclei, visible

nucleoli, and moderate variability in both size and shape)

.

Mitotic Rate

___ Score 2

.

Overall Grade

___ Grade 2 (scores of 6 or 7)

.

Ductal Carcinoma In Situ (DCIS)

___ Present

+ ___ Negative for extensive intraductal component (EIC)

.

___ Architectural Patterns

+ ___ Papillary

.

+ Nuclear Grade

+ ___ Grade II (intermediate)

.

+ Necrosis (If DCIS is present in specimen)

+ ___ Not identified

.

+ Lobular Carcinoma In Situ (LCIS)

+ ___ Not identified

.

Margins

.

Invasive Carcinoma Margins

___ Uninvolved by invasive carcinoma

Distance from closest margin: 9 mm

.

+ Specify closest margin(s): Superior Anterior margin

.

+ Distance from other margins:

+ ___ Posterior: 70 mm

.

DCIS Margins

___ Uninvolved by DCIS

.

Regional Lymph Nodes

___ Uninvolved by tumor cells

Total Number of Lymph Nodes Examined: 2

Number of Sabattus Nodes Examined: 2

.

+ Lymphovascular Invasion

+ ___ Not identified

.

+ Dermal Lymphovascular Invasion

+ ___ Not identified

.

Pathologic Stage Classification (pTNM, AJCC 8th Edition)

.

Primary Tumor (pT)

___ pT2:Tumor >20 mm but =50 mm in greatest dimension

.

Regional Lymph Nodes (pN)

___ pN0:No regional lymph node metastasis identified or ITCs only

.

+ Additional Pathologic Findings:  See diagnoses

.

+ Microcalcifications

+ ___ Present in DCIS

.

(JPM:mml; 02/22/2021)

LBQ  02/22/2021  1327 Local

**********************************************************************

.                                                                02

Comment:

The sentinel lymph nodes are examined at multiple levels.  In addition,

immunoperoxidase stains for AE1/AE3 are also obtained on the sentinel

lymph nodes and yield the following results:

.

AE1/AE3 (A1): Negative for tumor

AE1/AE3 (B1): Negative for tumor

.

Thus, there are a total of two sentinel lymph nodes which are negative for

tumor.

.

(JPM/db/mml; 2/19/2021)

.                                                                02

Electronically signed:                                     .

See Rader MD, Pathologist

NPI- [id number]

.                                                                01

Gross description:                                         .

A.  PLEASE SEE GROSS DESCRIPTION UNDER FROZEN SECTION DIAGNOSIS.

.

B.  PLEASE SEE GROSS DESCRIPTION UNDER FROZEN SECTION DIAGNOSIS.

.

C. The specimen is received in formalin, labeled "Green, Mare, right

breast stitch 12:00" and consists of an oriented 1259 g mastectomy

specimen with a stitch designating 12:00.  The breast tissue measures 23.0

x 17.0 x 6.0 cm with an unremarkable anterior skin ellipse measuring 24.6

x 16.0 cm which displays an everted nipple areolar complex measuring 5.5 x

5.5 cm.  Superior/anterior is inked blue, inferior anterior green, and

posterior black.  It is sectioned revealing a multi lobulated pink mass at

the approximate 12:00 measuring 2.3 x 2.1 cm that is to margins as

follows: 0.7 cm superior, 7.0 cm posterior, 11.8 cm inferior.  The mass

shows previous biopsy changes.  The uninvolved parenchyma is yellow orange

lobulated with approximately 10% fibrous tissue and no additional masses

or lesions.  Representative sections are submitted as follows:

.

C1: Nipple

C2: Mass to superior

C3: Largest full thickness mass

C4: Additional mass

C5: Posterior margin

C6: Upper inner quadrant

C7: Lower inner quadrant

C8: Upper outer quadrant

C9: Lower outer quadrant

(SDY; 2/17/2021)

SYU/MBR  02/19/2021  1702 Local

.                                                                02

Pathologist provided ICD-10:

C50.911, D05.11, N60.11, N62, N60.31, N60.41

.                                                                02

CPT                                                        .

113799, 451068, 940994, 837944, 824042, [id number]

Specimen Comment: A courtesy copy of this report has been sent to [phone]

Specimen Comment: [id number]

Specimen Comment: Report sent to  / DR REZA

***Performed at:  01

   [facility]

   [address], [place]  [id number]

   MD Tex Blevins MD Phone:  [phone]

***Performed at:  02

   [facility]

   [address] [place], [place]  [id number]

   MD See Rader MD Phone:  [phone]

## 2021-04-15 ENCOUNTER — HOSPITAL ENCOUNTER (OUTPATIENT)
Dept: HOSPITAL 61 - ONCLAB | Age: 61
End: 2021-04-15
Attending: PHYSICIAN ASSISTANT
Payer: COMMERCIAL

## 2021-04-15 DIAGNOSIS — C50.911: Primary | ICD-10-CM

## 2021-04-15 LAB
ALBUMIN SERPL-MCNC: 3.1 G/DL (ref 3.4–5)
ALBUMIN/GLOB SERPL: 0.7 {RATIO} (ref 1–1.7)
ALP SERPL-CCNC: 73 U/L (ref 46–116)
ALT SERPL-CCNC: 14 U/L (ref 14–59)
ANION GAP SERPL CALC-SCNC: 11 MMOL/L (ref 6–14)
AST SERPL-CCNC: 10 U/L (ref 15–37)
BASOPHILS # BLD AUTO: 0 X10^3/UL (ref 0–0.2)
BASOPHILS NFR BLD: 1 % (ref 0–3)
BILIRUB SERPL-MCNC: 0.4 MG/DL (ref 0.2–1)
BUN SERPL-MCNC: 14 MG/DL (ref 7–20)
BUN/CREAT SERPL: 3 (ref 6–20)
CALCIUM SERPL-MCNC: 10.5 MG/DL (ref 8.5–10.1)
CHLORIDE SERPL-SCNC: 99 MMOL/L (ref 98–107)
CO2 SERPL-SCNC: 32 MMOL/L (ref 21–32)
CREAT SERPL-MCNC: 4.5 MG/DL (ref 0.6–1)
EOSINOPHIL NFR BLD: 0.1 X10^3/UL (ref 0–0.7)
EOSINOPHIL NFR BLD: 2 % (ref 0–3)
ERYTHROCYTE [DISTWIDTH] IN BLOOD BY AUTOMATED COUNT: 14.5 % (ref 11.5–14.5)
GFR SERPLBLD BASED ON 1.73 SQ M-ARVRAT: 12 ML/MIN
GLUCOSE SERPL-MCNC: 83 MG/DL (ref 70–99)
HCT VFR BLD CALC: 24.9 % (ref 36–47)
HGB BLD-MCNC: 8.7 G/DL (ref 12–15.5)
LDH SERPL L TO P-CCNC: 147 U/L (ref 81–234)
LYMPHOCYTES # BLD: 0.8 X10^3/UL (ref 1–4.8)
LYMPHOCYTES NFR BLD AUTO: 19 % (ref 24–48)
MCH RBC QN AUTO: 33 PG (ref 25–35)
MCHC RBC AUTO-ENTMCNC: 35 G/DL (ref 31–37)
MCV RBC AUTO: 95 FL (ref 79–100)
MONO #: 0.2 X10^3/UL (ref 0–1.1)
MONOCYTES NFR BLD: 6 % (ref 0–9)
NEUT #: 2.8 X10^3/UL (ref 1.8–7.7)
NEUTROPHILS NFR BLD AUTO: 72 % (ref 31–73)
PLATELET # BLD AUTO: 180 X10^3/UL (ref 140–400)
POTASSIUM SERPL-SCNC: 3.1 MMOL/L (ref 3.5–5.1)
PROT SERPL-MCNC: 7.7 G/DL (ref 6.4–8.2)
RBC # BLD AUTO: 2.63 X10^6/UL (ref 3.5–5.4)
SODIUM SERPL-SCNC: 142 MMOL/L (ref 136–145)
WBC # BLD AUTO: 3.9 X10^3/UL (ref 4–11)

## 2021-04-15 PROCEDURE — 83540 ASSAY OF IRON: CPT

## 2021-04-15 PROCEDURE — 83550 IRON BINDING TEST: CPT

## 2021-04-15 PROCEDURE — 80053 COMPREHEN METABOLIC PANEL: CPT

## 2021-04-15 PROCEDURE — 83615 LACTATE (LD) (LDH) ENZYME: CPT

## 2021-04-15 PROCEDURE — 86300 IMMUNOASSAY TUMOR CA 15-3: CPT

## 2021-04-15 PROCEDURE — 82728 ASSAY OF FERRITIN: CPT

## 2021-04-15 PROCEDURE — 82668 ASSAY OF ERYTHROPOIETIN: CPT

## 2021-04-15 PROCEDURE — 85025 COMPLETE CBC W/AUTO DIFF WBC: CPT

## 2021-04-15 PROCEDURE — 36415 COLL VENOUS BLD VENIPUNCTURE: CPT

## 2021-04-16 LAB — CANCER AG27-29 SERPL-ACNC: 24.4 U/ML (ref 0–38.6)

## 2021-05-12 ENCOUNTER — HOSPITAL ENCOUNTER (OUTPATIENT)
Dept: HOSPITAL 61 - ONCLAB | Age: 61
End: 2021-05-12
Attending: PHYSICIAN ASSISTANT
Payer: COMMERCIAL

## 2021-05-12 DIAGNOSIS — I10: ICD-10-CM

## 2021-05-12 DIAGNOSIS — C50.211: Primary | ICD-10-CM

## 2021-05-12 DIAGNOSIS — N18.6: ICD-10-CM

## 2021-05-12 LAB
ALBUMIN SERPL-MCNC: 3.5 G/DL (ref 3.4–5)
ALBUMIN/GLOB SERPL: 0.9 {RATIO} (ref 1–1.7)
ALP SERPL-CCNC: 66 U/L (ref 46–116)
ALT SERPL-CCNC: 13 U/L (ref 14–59)
ANION GAP SERPL CALC-SCNC: 10 MMOL/L (ref 6–14)
AST SERPL-CCNC: 10 U/L (ref 15–37)
BASOPHILS # BLD AUTO: 0 X10^3/UL (ref 0–0.2)
BASOPHILS NFR BLD: 1 % (ref 0–3)
BILIRUB SERPL-MCNC: 0.3 MG/DL (ref 0.2–1)
BUN SERPL-MCNC: 44 MG/DL (ref 7–20)
BUN/CREAT SERPL: 6 (ref 6–20)
CALCIUM SERPL-MCNC: 10.2 MG/DL (ref 8.5–10.1)
CHLORIDE SERPL-SCNC: 102 MMOL/L (ref 98–107)
CO2 SERPL-SCNC: 33 MMOL/L (ref 21–32)
CREAT SERPL-MCNC: 7.7 MG/DL (ref 0.6–1)
EOSINOPHIL NFR BLD: 0 X10^3/UL (ref 0–0.7)
EOSINOPHIL NFR BLD: 1 % (ref 0–3)
ERYTHROCYTE [DISTWIDTH] IN BLOOD BY AUTOMATED COUNT: 15.5 % (ref 11.5–14.5)
GFR SERPLBLD BASED ON 1.73 SQ M-ARVRAT: 6.5 ML/MIN
GLUCOSE SERPL-MCNC: 85 MG/DL (ref 70–99)
HCT VFR BLD CALC: 26 % (ref 36–47)
HGB BLD-MCNC: 8.8 G/DL (ref 12–15.5)
LYMPHOCYTES # BLD: 0.7 X10^3/UL (ref 1–4.8)
LYMPHOCYTES NFR BLD AUTO: 21 % (ref 24–48)
MCH RBC QN AUTO: 33 PG (ref 25–35)
MCHC RBC AUTO-ENTMCNC: 34 G/DL (ref 31–37)
MCV RBC AUTO: 97 FL (ref 79–100)
MONO #: 0.2 X10^3/UL (ref 0–1.1)
MONOCYTES NFR BLD: 7 % (ref 0–9)
NEUT #: 2.4 X10^3/UL (ref 1.8–7.7)
NEUTROPHILS NFR BLD AUTO: 70 % (ref 31–73)
PLATELET # BLD AUTO: 178 X10^3/UL (ref 140–400)
POTASSIUM SERPL-SCNC: 4 MMOL/L (ref 3.5–5.1)
PROT SERPL-MCNC: 7.6 G/DL (ref 6.4–8.2)
RBC # BLD AUTO: 2.68 X10^6/UL (ref 3.5–5.4)
SODIUM SERPL-SCNC: 145 MMOL/L (ref 136–145)
T4 FREE SERPL-MCNC: 1.17 NG/DL (ref 0.76–1.46)
THYROID STIM HORMONE (TSH): 2.22 UIU/ML (ref 0.36–3.74)
WBC # BLD AUTO: 3.5 X10^3/UL (ref 4–11)

## 2021-05-12 PROCEDURE — 82784 ASSAY IGA/IGD/IGG/IGM EACH: CPT

## 2021-05-12 PROCEDURE — 83921 ORGANIC ACID SINGLE QUANT: CPT

## 2021-05-12 PROCEDURE — 83520 IMMUNOASSAY QUANT NOS NONAB: CPT

## 2021-05-12 PROCEDURE — 86334 IMMUNOFIX E-PHORESIS SERUM: CPT

## 2021-05-12 PROCEDURE — 84165 PROTEIN E-PHORESIS SERUM: CPT

## 2021-05-12 PROCEDURE — 84443 ASSAY THYROID STIM HORMONE: CPT

## 2021-05-12 PROCEDURE — 85025 COMPLETE CBC W/AUTO DIFF WBC: CPT

## 2021-05-12 PROCEDURE — 36415 COLL VENOUS BLD VENIPUNCTURE: CPT

## 2021-05-12 PROCEDURE — 82306 VITAMIN D 25 HYDROXY: CPT

## 2021-05-12 PROCEDURE — 84439 ASSAY OF FREE THYROXINE: CPT

## 2021-05-12 PROCEDURE — 80053 COMPREHEN METABOLIC PANEL: CPT

## 2021-05-13 LAB
ALBUM: 3.5 G/DL (ref 2.9–4.4)
ALPHA1 GLOB SERPL ELPH-MCNC: 0.2 G/DL (ref 0–0.4)
ALPHA2 GLOB SERPL ELPH-MCNC: 0.8 G/DL (ref 0.4–1)
B-GLOBULIN SERPL ELPH-MCNC: 0.8 G/DL (ref 0.7–1.3)
GAMMA GLOB SERPL ELPH-MCNC: 1.8 G/DL (ref 0.4–1.8)
IGA SERPL-MCNC: 127 MG/DL (ref 87–352)
IGG SERPL-MCNC: 1442 MG/DL (ref 586–1602)
IGM SERPL-MCNC: 201 MG/DL (ref 26–217)
KAPPA LC SERPL-MCNC: 283.4 MG/L (ref 3.3–19.4)
KAPPA LC/LAMBDA SER: 1.47 {RATIO} (ref 0.26–1.65)
LAMBDA LC FREE SERPL-MCNC: 193.3 MG/L (ref 5.7–26.3)
PROT SERPL-MCNC: 7.1 G/DL (ref 6–8.5)
SPEP AG RATIO: 1 (ref 0.7–1.7)

## 2021-05-14 ENCOUNTER — HOSPITAL ENCOUNTER (OUTPATIENT)
Dept: HOSPITAL 61 - KCIC DEXA | Age: 61
End: 2021-05-14
Attending: PHYSICIAN ASSISTANT
Payer: COMMERCIAL

## 2021-05-14 DIAGNOSIS — Z78.0: ICD-10-CM

## 2021-05-14 DIAGNOSIS — Z13.820: Primary | ICD-10-CM

## 2021-05-14 DIAGNOSIS — C50.211: ICD-10-CM

## 2021-05-14 PROCEDURE — 77080 DXA BONE DENSITY AXIAL: CPT

## 2021-05-14 NOTE — KCIC
INDICATION: Screening for osteopenia/osteoporosis.  Postmenopausal evaluation.



COMPARISON: None.



TECHNIQUE:  Bone densitometry was performed through the lumbar spine and proximal femur.



IMPRESSION: 



Lumbar Spine: 

BMD: 1.14

T-Score: 0.8

Range: Normal 



Proximal Femur:

BMD: 0.9

T-Score: -0.2

Range: Normal 







World Health Organization Criteria for Bone Density:



T-Score:

> -1.0: Normal Range

< -1.0 to -2.5:  Osteopenic Range

< -2.5: Osteoporotic Range



Electronically signed by: Jacob Falcon MD (5/14/2021 11:45 AM) [id number]

## 2021-07-13 ENCOUNTER — HOSPITAL ENCOUNTER (OUTPATIENT)
Dept: HOSPITAL 61 - ONCLAB | Age: 61
End: 2021-07-13
Attending: INTERNAL MEDICINE
Payer: COMMERCIAL

## 2021-07-13 DIAGNOSIS — C50.211: Primary | ICD-10-CM

## 2021-07-13 LAB
ALBUMIN SERPL-MCNC: 3.2 G/DL (ref 3.4–5)
ALBUMIN/GLOB SERPL: 0.7 {RATIO} (ref 1–1.7)
ALP SERPL-CCNC: 103 U/L (ref 46–116)
ALT SERPL-CCNC: 16 U/L (ref 14–59)
ANION GAP SERPL CALC-SCNC: 12 MMOL/L (ref 6–14)
AST SERPL-CCNC: 13 U/L (ref 15–37)
BASOPHILS # BLD AUTO: 0 X10^3/UL (ref 0–0.2)
BASOPHILS NFR BLD: 1 % (ref 0–3)
BILIRUB SERPL-MCNC: 0.3 MG/DL (ref 0.2–1)
BUN SERPL-MCNC: 65 MG/DL (ref 7–20)
BUN/CREAT SERPL: 6 (ref 6–20)
CALCIUM SERPL-MCNC: 10.1 MG/DL (ref 8.5–10.1)
CHLORIDE SERPL-SCNC: 108 MMOL/L (ref 98–107)
CO2 SERPL-SCNC: 25 MMOL/L (ref 21–32)
CREAT SERPL-MCNC: 10.3 MG/DL (ref 0.6–1)
EOSINOPHIL NFR BLD: 0.1 X10^3/UL (ref 0–0.7)
EOSINOPHIL NFR BLD: 2 % (ref 0–3)
ERYTHROCYTE [DISTWIDTH] IN BLOOD BY AUTOMATED COUNT: 16.2 % (ref 11.5–14.5)
GFR SERPLBLD BASED ON 1.73 SQ M-ARVRAT: 4.6 ML/MIN
GLUCOSE SERPL-MCNC: 96 MG/DL (ref 70–99)
HCT VFR BLD CALC: 31.1 % (ref 36–47)
HGB BLD-MCNC: 10.2 G/DL (ref 12–15.5)
LYMPHOCYTES # BLD: 0.8 X10^3/UL (ref 1–4.8)
LYMPHOCYTES NFR BLD AUTO: 22 % (ref 24–48)
MCH RBC QN AUTO: 32 PG (ref 25–35)
MCHC RBC AUTO-ENTMCNC: 33 G/DL (ref 31–37)
MCV RBC AUTO: 96 FL (ref 79–100)
MONO #: 0.2 X10^3/UL (ref 0–1.1)
MONOCYTES NFR BLD: 4 % (ref 0–9)
NEUT #: 2.8 X10^3/UL (ref 1.8–7.7)
NEUTROPHILS NFR BLD AUTO: 72 % (ref 31–73)
PLATELET # BLD AUTO: 175 X10^3/UL (ref 140–400)
POTASSIUM SERPL-SCNC: 6.3 MMOL/L (ref 3.5–5.1)
PROT SERPL-MCNC: 7.7 G/DL (ref 6.4–8.2)
RBC # BLD AUTO: 3.24 X10^6/UL (ref 3.5–5.4)
SODIUM SERPL-SCNC: 145 MMOL/L (ref 136–145)
WBC # BLD AUTO: 3.9 X10^3/UL (ref 4–11)

## 2021-07-13 PROCEDURE — 82746 ASSAY OF FOLIC ACID SERUM: CPT

## 2021-07-13 PROCEDURE — 36415 COLL VENOUS BLD VENIPUNCTURE: CPT

## 2021-07-13 PROCEDURE — 82607 VITAMIN B-12: CPT

## 2021-07-13 PROCEDURE — 80053 COMPREHEN METABOLIC PANEL: CPT

## 2021-07-13 PROCEDURE — 83550 IRON BINDING TEST: CPT

## 2021-07-13 PROCEDURE — 83921 ORGANIC ACID SINGLE QUANT: CPT

## 2021-07-13 PROCEDURE — 83540 ASSAY OF IRON: CPT

## 2021-07-13 PROCEDURE — 85025 COMPLETE CBC W/AUTO DIFF WBC: CPT

## 2021-07-16 LAB — METHYLMALONATE SERPL-SCNC: 636 NMOL/L (ref 0–378)

## 2021-07-20 ENCOUNTER — HOSPITAL ENCOUNTER (OUTPATIENT)
Dept: HOSPITAL 61 - ONCLAB | Age: 61
End: 2021-07-20
Attending: INTERNAL MEDICINE
Payer: COMMERCIAL

## 2021-07-20 DIAGNOSIS — C50.211: Primary | ICD-10-CM

## 2021-07-20 LAB
ANION GAP SERPL CALC-SCNC: 5 MMOL/L (ref 6–14)
BUN SERPL-MCNC: 26 MG/DL (ref 7–20)
CALCIUM SERPL-MCNC: 9.6 MG/DL (ref 8.5–10.1)
CHLORIDE SERPL-SCNC: 103 MMOL/L (ref 98–107)
CO2 SERPL-SCNC: 33 MMOL/L (ref 21–32)
CREAT SERPL-MCNC: 5.6 MG/DL (ref 0.6–1)
GFR SERPLBLD BASED ON 1.73 SQ M-ARVRAT: 9.3 ML/MIN
GLUCOSE SERPL-MCNC: 97 MG/DL (ref 70–99)
POTASSIUM SERPL-SCNC: 3.8 MMOL/L (ref 3.5–5.1)
SODIUM SERPL-SCNC: 141 MMOL/L (ref 136–145)

## 2021-07-20 PROCEDURE — 36415 COLL VENOUS BLD VENIPUNCTURE: CPT

## 2021-07-20 PROCEDURE — 80048 BASIC METABOLIC PNL TOTAL CA: CPT

## 2021-11-19 ENCOUNTER — HOSPITAL ENCOUNTER (OUTPATIENT)
Dept: HOSPITAL 61 - ONCLAB | Age: 61
End: 2021-11-19
Attending: PHYSICIAN ASSISTANT
Payer: COMMERCIAL

## 2021-11-19 DIAGNOSIS — C50.211: Primary | ICD-10-CM

## 2021-11-19 LAB
BASOPHILS # BLD AUTO: 0 X10^3/UL (ref 0–0.2)
BASOPHILS NFR BLD: 1 % (ref 0–3)
EOSINOPHIL NFR BLD: 0.1 X10^3/UL (ref 0–0.7)
EOSINOPHIL NFR BLD: 1 % (ref 0–3)
ERYTHROCYTE [DISTWIDTH] IN BLOOD BY AUTOMATED COUNT: 16.2 % (ref 11.5–14.5)
HCT VFR BLD CALC: 33.2 % (ref 36–47)
HGB BLD-MCNC: 10.8 G/DL (ref 12–15.5)
LYMPHOCYTES # BLD: 0.6 X10^3/UL (ref 1–4.8)
LYMPHOCYTES NFR BLD AUTO: 14 % (ref 24–48)
MCH RBC QN AUTO: 31 PG (ref 25–35)
MCHC RBC AUTO-ENTMCNC: 33 G/DL (ref 31–37)
MCV RBC AUTO: 96 FL (ref 79–100)
MONO #: 0.2 X10^3/UL (ref 0–1.1)
MONOCYTES NFR BLD: 6 % (ref 0–9)
NEUT #: 3.2 X10^3/UL (ref 1.8–7.7)
NEUTROPHILS NFR BLD AUTO: 78 % (ref 31–73)
PLATELET # BLD AUTO: 155 X10^3/UL (ref 140–400)
RBC # BLD AUTO: 3.45 X10^6/UL (ref 3.5–5.4)
WBC # BLD AUTO: 4.1 X10^3/UL (ref 4–11)

## 2021-11-19 PROCEDURE — 82607 VITAMIN B-12: CPT

## 2021-11-19 PROCEDURE — 36415 COLL VENOUS BLD VENIPUNCTURE: CPT

## 2021-11-19 PROCEDURE — 83550 IRON BINDING TEST: CPT

## 2021-11-19 PROCEDURE — 83540 ASSAY OF IRON: CPT

## 2021-11-19 PROCEDURE — 83921 ORGANIC ACID SINGLE QUANT: CPT

## 2021-11-19 PROCEDURE — 85025 COMPLETE CBC W/AUTO DIFF WBC: CPT

## 2021-11-19 PROCEDURE — 82746 ASSAY OF FOLIC ACID SERUM: CPT

## 2021-11-19 PROCEDURE — 82728 ASSAY OF FERRITIN: CPT

## 2021-11-27 LAB — METHYLMALONATE SERPL-SCNC: 997 NMOL/L (ref 0–378)

## 2021-12-30 ENCOUNTER — HOSPITAL ENCOUNTER (OUTPATIENT)
Dept: HOSPITAL 61 - US | Age: 61
Discharge: HOME | End: 2021-12-30
Attending: SURGERY
Payer: COMMERCIAL

## 2021-12-30 VITALS
SYSTOLIC BLOOD PRESSURE: 191 MMHG | DIASTOLIC BLOOD PRESSURE: 75 MMHG | DIASTOLIC BLOOD PRESSURE: 75 MMHG | SYSTOLIC BLOOD PRESSURE: 191 MMHG

## 2021-12-30 DIAGNOSIS — Z99.2: ICD-10-CM

## 2021-12-30 DIAGNOSIS — M19.90: ICD-10-CM

## 2021-12-30 DIAGNOSIS — Z72.89: ICD-10-CM

## 2021-12-30 DIAGNOSIS — Z85.3: ICD-10-CM

## 2021-12-30 DIAGNOSIS — I13.2: ICD-10-CM

## 2021-12-30 DIAGNOSIS — Z79.82: ICD-10-CM

## 2021-12-30 DIAGNOSIS — Z91.040: ICD-10-CM

## 2021-12-30 DIAGNOSIS — Z98.890: ICD-10-CM

## 2021-12-30 DIAGNOSIS — R22.2: Primary | ICD-10-CM

## 2021-12-30 DIAGNOSIS — Z79.899: ICD-10-CM

## 2021-12-30 DIAGNOSIS — Z91.041: ICD-10-CM

## 2021-12-30 DIAGNOSIS — Z88.0: ICD-10-CM

## 2021-12-30 DIAGNOSIS — F41.9: ICD-10-CM

## 2021-12-30 DIAGNOSIS — Z88.8: ICD-10-CM

## 2021-12-30 DIAGNOSIS — Z87.891: ICD-10-CM

## 2021-12-30 DIAGNOSIS — I50.9: ICD-10-CM

## 2021-12-30 DIAGNOSIS — E66.9: ICD-10-CM

## 2021-12-30 DIAGNOSIS — N18.6: ICD-10-CM

## 2021-12-30 PROCEDURE — 20206 BIOPSY MUSCLE PERQ NEEDLE: CPT

## 2021-12-30 PROCEDURE — 76604 US EXAM CHEST: CPT

## 2021-12-30 PROCEDURE — 76942 ECHO GUIDE FOR BIOPSY: CPT

## 2021-12-30 NOTE — RAD
Procedure: US CHEST, US GUIDED BIOPSY, US ASPIRATION LRG JOINT



Clinical Indication: History of breast cancer. Patient has a palpable abnormality along the mastectom
y scar.



Anesthesia: Local anesthesia only. Continuous cardiopulmonary monitoring was performed by independent
 qualified nursing personnel.



Complications: None



Ultrasound: Along the right breast scar is a 4.2 x 1.3 cm isoechoic focus with areas of hypoechogenic
ity posteriorly. There is areas of posterior shadowing.



Consent:  The procedure was explained in its entirety to the patient or the patients designated repre
sentative by a member of the treatment team, including a discussion of the risks, benefits and common
ly accepted alternatives to the procedure, as well as the expected consequences of no therapy whatsoe
deanne. Discussion of the risks included, but was not limited to, those that are most frequent and those
 that are rare but possibly severe or life-threatening, as well as the possibility of unforeseen comp
lications. All questions were answered and informed consent was obtained. 



Sterility: All elements of maximal sterile barrier technique including the use of a cap, mask, steril
e gown, sterile gloves, large sterile sheet, appropriate hand hygiene, and 2% chlorhexidine for cutan
eous antisepsis (or acceptable alternative antiseptic per current guidelines) were followed for this 
procedure.



Patient was placed in the supine position. The right chest wall was prepped and draped in the appropr
iate sterile fashion.  A timeout was performed. The palpable abnormality was located with ultrasound 
and local lidocaine was applied. A ju tipped 17-gauge introducer needle was advanced into the ma
sslike lesion. 3 core needle biopsies were obtained with an 18-gauge device. Samples were placed in f
ormalin. [place] were removed and pressure was held to achieve hemostasis. A sterile dressing was sheba
aram over top. Patient tolerated the procedure.



Impression: 



1. Palpable abnormality along patient's mastectomy scar on the right corresponding to a mixed echogen
icity 4.2 x 1.3 cm mass.

2. Ultrasound-guided core needle biopsy of right chest wall mass.



Electronically signed by: Alirio Huffman (12/30/2021 4:22 PM) [id number]

## 2021-12-30 NOTE — RAD
Procedure: US CHEST, US GUIDED BIOPSY, US ASPIRATION LRG JOINT



Clinical Indication: History of breast cancer. Patient has a palpable abnormality along the mastectom
y scar.



Anesthesia: Local anesthesia only. Continuous cardiopulmonary monitoring was performed by independent
 qualified nursing personnel.



Complications: None



Ultrasound: Along the right breast scar is a 4.2 x 1.3 cm isoechoic focus with areas of hypoechogenic
ity posteriorly. There is areas of posterior shadowing.



Consent:  The procedure was explained in its entirety to the patient or the patients designated repre
sentative by a member of the treatment team, including a discussion of the risks, benefits and common
ly accepted alternatives to the procedure, as well as the expected consequences of no therapy whatsoe
deanne. Discussion of the risks included, but was not limited to, those that are most frequent and those
 that are rare but possibly severe or life-threatening, as well as the possibility of unforeseen comp
lications. All questions were answered and informed consent was obtained. 



Sterility: All elements of maximal sterile barrier technique including the use of a cap, mask, steril
e gown, sterile gloves, large sterile sheet, appropriate hand hygiene, and 2% chlorhexidine for cutan
eous antisepsis (or acceptable alternative antiseptic per current guidelines) were followed for this 
procedure.



Patient was placed in the supine position. The right chest wall was prepped and draped in the appropr
iate sterile fashion.  A timeout was performed. The palpable abnormality was located with ultrasound 
and local lidocaine was applied. A ju tipped 17-gauge introducer needle was advanced into the ma
sslike lesion. 3 core needle biopsies were obtained with an 18-gauge device. Samples were placed in f
ormalin. [place] were removed and pressure was held to achieve hemostasis. A sterile dressing was sheba
aram over top. Patient tolerated the procedure.



Impression: 



1. Palpable abnormality along patient's mastectomy scar on the right corresponding to a mixed echogen
icity 4.2 x 1.3 cm mass.

2. Ultrasound-guided core needle biopsy of right chest wall mass.



Electronically signed by: Alirio Huffman (12/30/2021 4:22 PM) [id number]

## 2021-12-30 NOTE — RAD
Procedure: US CHEST, US GUIDED BIOPSY, US ASPIRATION LRG JOINT



Clinical Indication: History of breast cancer. Patient has a palpable abnormality along the mastectom
y scar.



Anesthesia: Local anesthesia only. Continuous cardiopulmonary monitoring was performed by independent
 qualified nursing personnel.



Complications: None



Ultrasound: Along the right breast scar is a 4.2 x 1.3 cm isoechoic focus with areas of hypoechogenic
ity posteriorly. There is areas of posterior shadowing.



Consent:  The procedure was explained in its entirety to the patient or the patients designated repre
sentative by a member of the treatment team, including a discussion of the risks, benefits and common
ly accepted alternatives to the procedure, as well as the expected consequences of no therapy whatsoe
deanne. Discussion of the risks included, but was not limited to, those that are most frequent and those
 that are rare but possibly severe or life-threatening, as well as the possibility of unforeseen comp
lications. All questions were answered and informed consent was obtained. 



Sterility: All elements of maximal sterile barrier technique including the use of a cap, mask, steril
e gown, sterile gloves, large sterile sheet, appropriate hand hygiene, and 2% chlorhexidine for cutan
eous antisepsis (or acceptable alternative antiseptic per current guidelines) were followed for this 
procedure.



Patient was placed in the supine position. The right chest wall was prepped and draped in the appropr
iate sterile fashion.  A timeout was performed. The palpable abnormality was located with ultrasound 
and local lidocaine was applied. A ju tipped 17-gauge introducer needle was advanced into the ma
sslike lesion. 3 core needle biopsies were obtained with an 18-gauge device. Samples were placed in f
ormalin. [place] were removed and pressure was held to achieve hemostasis. A sterile dressing was sheba
aram over top. Patient tolerated the procedure.



Impression: 



1. Palpable abnormality along patient's mastectomy scar on the right corresponding to a mixed echogen
icity 4.2 x 1.3 cm mass.

2. Ultrasound-guided core needle biopsy of right chest wall mass.



Electronically signed by: Alirio Huffman (12/30/2021 4:22 PM) [id number]

## 2022-01-03 NOTE — PATHOLOGY
[facility] Accession Number: [id number]

.                                                                01

Material submitted:                                        .

chest - RT CHEST WALL MASS BX. Modifiers: right, wall

.                                                                01

Clinical history:                                          .

RIGHT CHEST WALL BIOPSY

.                                                                02

**********************************************************************

Diagnosis:

Segments of fibroadipose tissue, right chest wall mass, biopsies:

- Fat necrosis, with focal dystrophic calcification.

.

([facility]:mml; 01/03/2022)

[facility]  01/03/2022  1023 Local

**********************************************************************

.                                                                02

Comment:

There is no evidence of malignancy.

.

([facility]:mml; 01/03/2022)

.                                                                02

Electronically signed:                                     .

See Rader MD, Pathologist

NPI- [id number]

.                                                                01

Gross description:                                         .

The specimen is received in formalin, labeled "Green, Mare, right chest

wall". Received are 3 needle cores of pale tan tissue ranging in length

from 1.2 to 1.5 cm by 0.1 cm in diameter. The specimen is submitted

entirely in A1-A3.

([facility]; 12/30/2021)

[facility]  12/30/2021  1902 Local

.                                                                02

Pathologist provided ICD-10:

I96

.                                                                02

CPT                                                        .

[id number]

Specimen Comment: A courtesy copy of this report has been sent to [phone]

Specimen Comment: [id number]

Specimen Comment: Report sent to  / DR SOLOMON

***Performed at:  01

   [facility]

   [address], [place]  [id number]

   MD Tex Blevins MD Phone:  [phone]

***Performed at:  02

   [facility]

   [address] [place], [place]  [id number]

   MD See Rader MD Phone:  [phone]

## 2022-04-14 ENCOUNTER — HOSPITAL ENCOUNTER (OUTPATIENT)
Dept: HOSPITAL 61 - ONCLAB | Age: 62
End: 2022-04-14
Attending: PHYSICIAN ASSISTANT
Payer: COMMERCIAL

## 2022-04-14 DIAGNOSIS — C50.211: Primary | ICD-10-CM

## 2022-04-14 LAB
ALBUMIN SERPL-MCNC: 3.3 G/DL (ref 3.4–5)
ALBUMIN/GLOB SERPL: 0.8 {RATIO} (ref 1–1.7)
ALP SERPL-CCNC: 109 U/L (ref 46–116)
ALT SERPL-CCNC: 12 U/L (ref 14–59)
ANION GAP SERPL CALC-SCNC: 15 MMOL/L (ref 6–14)
AST SERPL-CCNC: 11 U/L (ref 15–37)
BASOPHILS # BLD AUTO: 0 X10^3/UL (ref 0–0.2)
BASOPHILS NFR BLD: 0 % (ref 0–3)
BILIRUB SERPL-MCNC: 0.4 MG/DL (ref 0.2–1)
BUN SERPL-MCNC: 71 MG/DL (ref 7–20)
BUN/CREAT SERPL: 5 (ref 6–20)
CALCIUM SERPL-MCNC: 9.8 MG/DL (ref 8.5–10.1)
CHLORIDE SERPL-SCNC: 102 MMOL/L (ref 98–107)
CO2 SERPL-SCNC: 26 MMOL/L (ref 21–32)
CREAT SERPL-MCNC: 13 MG/DL (ref 0.6–1)
EOSINOPHIL NFR BLD: 0 X10^3/UL (ref 0–0.7)
EOSINOPHIL NFR BLD: 1 % (ref 0–3)
ERYTHROCYTE [DISTWIDTH] IN BLOOD BY AUTOMATED COUNT: 14 % (ref 11.5–14.5)
GFR SERPLBLD BASED ON 1.73 SQ M-ARVRAT: 3.5 ML/MIN
GLUCOSE SERPL-MCNC: 94 MG/DL (ref 70–99)
HCT VFR BLD CALC: 28.9 % (ref 36–47)
HGB BLD-MCNC: 9.5 G/DL (ref 12–15.5)
LDH SERPL L TO P-CCNC: 171 U/L (ref 81–234)
LYMPHOCYTES # BLD: 0.5 X10^3/UL (ref 1–4.8)
LYMPHOCYTES NFR BLD AUTO: 17 % (ref 24–48)
MCH RBC QN AUTO: 32 PG (ref 25–35)
MCHC RBC AUTO-ENTMCNC: 33 G/DL (ref 31–37)
MCV RBC AUTO: 98 FL (ref 79–100)
MONO #: 0.2 X10^3/UL (ref 0–1.1)
MONOCYTES NFR BLD: 8 % (ref 0–9)
NEUT #: 2.3 X10^3/UL (ref 1.8–7.7)
NEUTROPHILS NFR BLD AUTO: 74 % (ref 31–73)
PLATELET # BLD AUTO: 145 X10^3/UL (ref 140–400)
POTASSIUM SERPL-SCNC: 5 MMOL/L (ref 3.5–5.1)
PROT SERPL-MCNC: 7.7 G/DL (ref 6.4–8.2)
RBC # BLD AUTO: 2.94 X10^6/UL (ref 3.5–5.4)
SODIUM SERPL-SCNC: 143 MMOL/L (ref 136–145)
WBC # BLD AUTO: 3.1 X10^3/UL (ref 4–11)

## 2022-04-14 PROCEDURE — 36415 COLL VENOUS BLD VENIPUNCTURE: CPT

## 2022-04-14 PROCEDURE — 80053 COMPREHEN METABOLIC PANEL: CPT

## 2022-04-14 PROCEDURE — 83615 LACTATE (LD) (LDH) ENZYME: CPT

## 2022-04-14 PROCEDURE — 85025 COMPLETE CBC W/AUTO DIFF WBC: CPT
